# Patient Record
Sex: MALE | Race: WHITE | NOT HISPANIC OR LATINO | Employment: FULL TIME | URBAN - METROPOLITAN AREA
[De-identification: names, ages, dates, MRNs, and addresses within clinical notes are randomized per-mention and may not be internally consistent; named-entity substitution may affect disease eponyms.]

---

## 2019-07-09 NOTE — PROGRESS NOTES
Subjective     John Price is a 49 year old male who presents to clinic today for the following health issues:    HPI     Headache  Onset: x 1 month    Description:   Location: Back of head up to the front   Character: sharp pain  Frequency:  Daily  Duration:  Last most of the day    Intensity: moderate, 2/10    Progression of Symptoms:  same    Accompanying Signs & Symptoms:  Stiff neck: YES  Neck or upper back pain: YES  Fever: no  Sinus pressure: YES  Nausea or vomiting: no  Dizziness: YES  Numbness: no  Weakness: no  Visual changes: no    History:   Head trauma: no  Family history of migraines: no  Previous tests for headaches: no  Neurologist evaluations: no  Able to do daily activities: YES  Wake with a headaches: YES  Do headaches wake you up: YES  Daily pain medication use: YES- Advil Cold & Sinus  Work/school stressors/changes: YES- Work, Daughter getting     Precipitating factors:   Does light make it worse: YES  Does sound make it worse: no    Alleviating factors:  Does sleep help: no  Therapies Tried and outcome: Naproxyn (Aleve)      -------------------------------------    Patient Active Problem List   Diagnosis     Anxiety state     Meniere's disease     Chronic rhinitis     Closed displaced fracture of fifth metacarpal bone of right hand     Degenerative tear of acetabular labrum of right hip     Obstructive sleep apnea syndrome     Morbid obesity (H)     Nonintractable episodic headache, unspecified headache type     History reviewed. No pertinent surgical history.    Social History     Tobacco Use     Smoking status: Never Smoker     Smokeless tobacco: Current User   Substance Use Topics     Alcohol use: Yes     History reviewed. No pertinent family history.      Current Outpatient Medications   Medication Sig Dispense Refill     clonazePAM (KLONOPIN) 1 MG tablet Take 1 mg by mouth daily       montelukast (SINGULAIR) 10 MG tablet Take 10 mg by mouth At Bedtime       No Known  "Allergies  BP Readings from Last 3 Encounters:   07/12/19 126/82    Wt Readings from Last 3 Encounters:   07/12/19 134.7 kg (297 lb)                    Reviewed and updated as needed this visit by Provider  Tobacco  Allergies  Meds  Problems  Med Hx  Surg Hx  Fam Hx         Review of Systems   ROS COMP: Constitutional, HEENT, cardiovascular, pulmonary, gi and gu systems are negative, except as otherwise noted.      Objective    /82 (BP Location: Right arm, Patient Position: Chair, Cuff Size: Adult Large)   Pulse 72   Temp 98  F (36.7  C) (Temporal)   Resp 16   Ht 1.803 m (5' 11\")   Wt 134.7 kg (297 lb)   SpO2 100%   BMI 41.42 kg/m    Body mass index is 41.42 kg/m .  Physical Exam   Constitutional: He appears well-developed and well-nourished.   HENT:   Head: Normocephalic and atraumatic.   Right Ear: External ear normal.   Left Ear: External ear normal.   Nose: Nose normal.   Narrow airway   Eyes: EOM are normal.   Neck: Neck supple.   Cardiovascular: Normal rate, regular rhythm, normal heart sounds and intact distal pulses.   Pulmonary/Chest: Effort normal and breath sounds normal.   Psychiatric: He has a normal mood and affect. His behavior is normal. Judgment and thought content normal.          Diagnostic Test Results:  Labs reviewed in Epic        Assessment & Plan   Problem List Items Addressed This Visit     Obstructive sleep apnea syndrome - Primary     Patient is a pleasant 49-year-old with history of obstructive sleep apnea, morbid obesity, anxiety, Ménière's disease who is here to establish care with new complaints of headache going on for the past 1 month.  He mostly has these headaches in the back of his neck going all the way up his scalp into his forehead.  Based on presentation these seem like tension headaches from lack of sleep and anxiety/stress.  He does have a diagnosis of sleep apnea but has not been using his sleep apnea which could be triggering the headaches.  I " "encouraged consistent use of CPAP before pursuing further investigations to evaluate his headache.  Patient is agreeable to this plan.  Advised to maintain headache log for the next month.  Follow-up in 1 month for reevaluation.         Morbid obesity (H)    Nonintractable episodic headache, unspecified headache type    Relevant Medications    clonazePAM (KLONOPIN) 1 MG tablet      Other Visit Diagnoses     Screen for colon cancer        Screening for hyperlipidemia                 BMI:   Estimated body mass index is 41.42 kg/m  as calculated from the following:    Height as of this encounter: 1.803 m (5' 11\").    Weight as of this encounter: 134.7 kg (297 lb).           See Patient Instructions  Return in about 2 months (around 9/12/2019).    Yessi Gan MD  [facility]    "

## 2019-07-12 ENCOUNTER — OFFICE VISIT (OUTPATIENT)
Dept: FAMILY MEDICINE | Facility: OTHER | Age: 49
End: 2019-07-12
Payer: COMMERCIAL

## 2019-07-12 VITALS
WEIGHT: 297 LBS | SYSTOLIC BLOOD PRESSURE: 126 MMHG | HEART RATE: 72 BPM | DIASTOLIC BLOOD PRESSURE: 82 MMHG | TEMPERATURE: 98 F | RESPIRATION RATE: 16 BRPM | OXYGEN SATURATION: 100 % | BODY MASS INDEX: 41.58 KG/M2 | HEIGHT: 71 IN

## 2019-07-12 DIAGNOSIS — Z12.11 SCREEN FOR COLON CANCER: ICD-10-CM

## 2019-07-12 DIAGNOSIS — G47.33 OBSTRUCTIVE SLEEP APNEA SYNDROME: Primary | ICD-10-CM

## 2019-07-12 DIAGNOSIS — R51.9 NONINTRACTABLE EPISODIC HEADACHE, UNSPECIFIED HEADACHE TYPE: ICD-10-CM

## 2019-07-12 DIAGNOSIS — E66.01 MORBID OBESITY (H): ICD-10-CM

## 2019-07-12 DIAGNOSIS — Z13.220 SCREENING FOR HYPERLIPIDEMIA: ICD-10-CM

## 2019-07-12 PROBLEM — M24.151 DEGENERATIVE TEAR OF ACETABULAR LABRUM OF RIGHT HIP: Status: ACTIVE | Noted: 2018-09-07

## 2019-07-12 PROBLEM — S62.306A CLOSED DISPLACED FRACTURE OF FIFTH METACARPAL BONE OF RIGHT HAND: Status: ACTIVE | Noted: 2018-01-22

## 2019-07-12 PROCEDURE — 99203 OFFICE O/P NEW LOW 30 MIN: CPT | Performed by: FAMILY MEDICINE

## 2019-07-12 RX ORDER — MONTELUKAST SODIUM 10 MG/1
10 TABLET ORAL AT BEDTIME
COMMUNITY
Start: 2019-03-29 | End: 2020-12-11

## 2019-07-12 RX ORDER — CLONAZEPAM 1 MG/1
1 TABLET ORAL DAILY
COMMUNITY
Start: 2017-03-06 | End: 2020-11-05

## 2019-07-12 ASSESSMENT — PAIN SCALES - GENERAL: PAINLEVEL: MILD PAIN (2)

## 2019-07-12 ASSESSMENT — MIFFLIN-ST. JEOR: SCORE: 2224.31

## 2019-07-12 NOTE — ASSESSMENT & PLAN NOTE
Patient is a pleasant 49-year-old with history of obstructive sleep apnea, morbid obesity, anxiety, Ménière's disease who is here to establish care with new complaints of headache going on for the past 1 month.  He mostly has these headaches in the back of his neck going all the way up his scalp into his forehead.  Based on presentation these seem like tension headaches from lack of sleep and anxiety/stress.  He does have a diagnosis of sleep apnea but has not been using his sleep apnea which could be triggering the headaches.  I encouraged consistent use of CPAP before pursuing further investigations to evaluate his headache.  Patient is agreeable to this plan.  Advised to maintain headache log for the next month.  Follow-up in 1 month for reevaluation.

## 2020-06-30 DIAGNOSIS — J30.9 ALLERGIC RHINITIS: Primary | ICD-10-CM

## 2020-06-30 PROCEDURE — 36415 COLL VENOUS BLD VENIPUNCTURE: CPT | Performed by: OTOLARYNGOLOGY

## 2020-06-30 PROCEDURE — 86003 ALLG SPEC IGE CRUDE XTRC EA: CPT | Performed by: OTOLARYNGOLOGY

## 2020-07-02 LAB
A ALTERNATA IGE QN: <0.1 KU(A)/L
A FUMIGATUS IGE QN: <0.1 KU(A)/L
CAT DANDER IGG QN: <0.1 KU(A)/L
COMMON RAGWEED IGE QN: <0.1 KU(A)/L
D FARINAE IGE QN: <0.1 KU(A)/L
D PTERONYSS IGE QN: <0.1 KU(A)/L
DOG DANDER+EPITH IGE QN: <0.1 KU(A)/L
KENT BLUE GRASS IGE QN: <0.1 KU(A)/L
MUGWORT IGE QN: <0.1 KU(A)/L
TIMOTHY IGE QN: <0.1 KU(A)/L
WHITE ELM IGE QN: <0.1 KU(A)/L
WHITE OAK IGE QN: <0.1 KU(A)/L

## 2020-11-05 ENCOUNTER — OFFICE VISIT (OUTPATIENT)
Dept: FAMILY MEDICINE | Facility: CLINIC | Age: 50
End: 2020-11-05
Payer: COMMERCIAL

## 2020-11-05 VITALS
HEART RATE: 96 BPM | SYSTOLIC BLOOD PRESSURE: 128 MMHG | OXYGEN SATURATION: 96 % | BODY MASS INDEX: 43.12 KG/M2 | TEMPERATURE: 97.6 F | WEIGHT: 309.2 LBS | RESPIRATION RATE: 12 BRPM | DIASTOLIC BLOOD PRESSURE: 88 MMHG

## 2020-11-05 DIAGNOSIS — L30.1 DYSHIDROTIC ECZEMA: Primary | ICD-10-CM

## 2020-11-05 PROCEDURE — 99213 OFFICE O/P EST LOW 20 MIN: CPT | Performed by: PHYSICIAN ASSISTANT

## 2020-11-05 RX ORDER — PREDNISONE 20 MG/1
TABLET ORAL
Qty: 11 TABLET | Refills: 0 | Status: SHIPPED | OUTPATIENT
Start: 2020-11-05 | End: 2020-11-24

## 2020-11-05 RX ORDER — MELOXICAM 7.5 MG/1
TABLET ORAL
COMMUNITY
Start: 2020-07-11 | End: 2020-12-11

## 2020-11-05 RX ORDER — SILDENAFIL 100 MG/1
TABLET, FILM COATED ORAL
COMMUNITY
Start: 2020-08-03 | End: 2020-12-11

## 2020-11-05 RX ORDER — VENLAFAXINE HYDROCHLORIDE 150 MG/1
CAPSULE, EXTENDED RELEASE ORAL
COMMUNITY
Start: 2020-10-17 | End: 2021-12-14

## 2020-11-05 RX ORDER — BENZTROPINE MESYLATE 0.5 MG/1
TABLET ORAL
COMMUNITY
Start: 2020-10-17 | End: 2021-12-19

## 2020-11-05 RX ORDER — TRIAMCINOLONE ACETONIDE 5 MG/G
CREAM TOPICAL 2 TIMES DAILY
Qty: 30 G | Refills: 0 | Status: SHIPPED | OUTPATIENT
Start: 2020-11-05 | End: 2020-11-19

## 2020-11-05 ASSESSMENT — ENCOUNTER SYMPTOMS
HEADACHES: 0
WHEEZING: 0
SHORTNESS OF BREATH: 0
CHILLS: 0
ARTHRALGIAS: 0
ADENOPATHY: 0
COUGH: 0
FATIGUE: 0
EYE DISCHARGE: 0
FEVER: 0
JOINT SWELLING: 0
DIAPHORESIS: 0
MYALGIAS: 0
EYE REDNESS: 0
EYE ITCHING: 0

## 2020-11-05 NOTE — PROGRESS NOTES
Subjective     John Price is a 50 year old male who presents to clinic today for the following health issues:    HPI         Rash  Onset/Duration: since sunday  Description  Location: both hands  Character: red  Itching: severe  Intensity:  severe  Progression of Symptoms:  same  Accompanying signs and symptoms:   Fever: no  Body aches or joint pain: no  Sore throat symptoms: no  Recent cold symptoms: no  History:           Previous episodes of similar rash: None  New exposures:  None  Recent travel: no  Exposure to similar rash: no  Precipitating or alleviating factors: none  Therapies tried and outcome: calamine lotion does not help and Benadryl - cream -  usually effective    John presents to the clinic today for evaluation of an itchy rash on his bilateral hands. He notes symptoms started Sunday, 5 days ago. His hands are intensely itchy, red and a little swollen. He notes some small bumps on the palms of his hands and his fingers, particularly between his fingers but the dorsal and palmar surfaces are both affected as well. He does not recall any trigger. It does not affect his feet or any other body parts. No new meds, pets, foods, soaps, detergents, lotions, or enviornmental contacts.    Review of Systems   Constitutional: Negative for chills, diaphoresis, fatigue and fever.   Eyes: Negative for discharge, redness and itching.   Respiratory: Negative for cough, shortness of breath and wheezing.    Musculoskeletal: Negative for arthralgias, joint swelling and myalgias.   Skin: Positive for rash (bilateral hands). Negative for pallor.   Neurological: Negative for headaches.   Hematological: Negative for adenopathy.          Objective    /88 (BP Location: Right arm)   Pulse 96   Temp 97.6  F (36.4  C) (Temporal)   Resp 12   Wt 140.3 kg (309 lb 3.2 oz)   SpO2 96%   BMI 43.12 kg/m    Body mass index is 43.12 kg/m .  Physical Exam   GENERAL: healthy, alert and no distress  MS: no gross  musculoskeletal defects noted, no edema  SKIN: Bilateral hands with swollen fingers that are minimally diffusely erythematous. Palms and webbing between fingers with small firm flesh colored papules. Dorsal fingers erythematous from excoriation. Dorsum of hand mostly spared.  PSYCH: mentation appears normal, affect normal/bright    Assessment & Plan     Dyshidrotic eczema  - predniSONE (DELTASONE) 20 MG tablet; Take 2 tabs daily x 3 days, then 1 tab daily x 3 days, then 1/2 tab daily x 3 days.  - triamcinolone (ARISTOCORT HP) 0.5 % external cream; Apply topically 2 times daily for 14 days    Discussed etiology. Avoid soaking in water. Wife is severely allergic to topical steroids. It causes a local reaction and throat swelling in her. We opted for oral steroids with topicals for him to use this weekend while hunting and away from his wife.    Return in about 2 weeks (around 11/19/2020) for Recheck with primary care provider if not improving.    Winnie Chaidez PA-C  [facility]

## 2020-11-05 NOTE — PATIENT INSTRUCTIONS
Prednisone taper as directed.  Triamcinolone cream to affected areas up to twice daily for up to 14 days.  Benadryl (diphenhydramine) 25-50 mg to help with sleep  Apply steroid cream only to affected areas twice daily. Avoid face and groin.  Eucerin, Aquaphor, VaniCream cream to whole body daily, especially right after bathing.  Avoid lotions with a scent as these can be irritating.

## 2020-11-11 ENCOUNTER — VIRTUAL VISIT (OUTPATIENT)
Dept: FAMILY MEDICINE | Facility: OTHER | Age: 50
End: 2020-11-11

## 2020-11-11 NOTE — PROGRESS NOTES
"Date: 2020 15:06:41  Clinician: Ban Acosta  Clinician NPI: [id number]  Patient: John Price  Patient : 1970  Patient Address: [id number] [place] [address] [place] [address]  Patient Phone: [phone]  Visit Protocol: URI  Patient Summary:  John is a 50 year old ( : 1970 ) male who initiated a [facility] Visit for COVID-19 (Coronavirus) evaluation and screening. When asked the question \"Please sign me up to receive news, health information and promotions. \", John responded \"No\".    John states his symptoms started suddenly 3-4 days ago.   His symptoms consist of myalgia, malaise, a sore throat, a headache, wheezing, a cough, and nasal congestion. He is experiencing mild difficulty breathing with activities but can speak normally in full sentences.   Symptom details     Nasal secretions: The color of his mucus is clear.    Cough: John coughs every 5-10 minutes and his cough is not more bothersome at night. Phlegm comes into his throat when he coughs. He does not believe his cough is caused by post-nasal drip. The color of the phlegm is clear.     Sore throat: John reports having severe throat pain (7-9 on a 10 point pain scale), does not have exudate on his tonsils, and can swallow liquids. The lymph nodes in his neck are not enlarged. A rash has not appeared on the skin since the sore throat started.     Wheezing: John has not ever been diagnosed with asthma. Additional wheezing details as reported by the patient (free text): Feeling run down hard to breathe       Headache: He states the headache is moderate (4-6 on a 10 point pain scale).      John denies having vomiting, rhinitis, facial pain or pressure, chills, teeth pain, ageusia, diarrhea, ear pain, fever, enlarged lymph nodes, nausea, and anosmia. He also denies taking antibiotic medication in the past month, having recent facial or sinus surgery in the past 60 days, and double sickening (worsening symptoms after initial " improvement).   Precipitating events  John is not sure if he has been exposed to someone with strep throat. He has not recently been exposed to someone with influenza. John has not been in close contact with any high risk individuals.   Pertinent COVID-19 (Coronavirus) information  John does not work or volunteer as healthcare worker or a . In the past 14 days, John has not worked or volunteered at a healthcare facility or group living setting.   In the past 14 days, he also has not lived in a congregate living setting.   John has not had a close contact with a laboratory-confirmed COVID-19 patient within 14 days of symptom onset.    Since December 2019, John has not been tested for COVID-19 and has not had upper respiratory infection or influenza-like illness.   Pertinent medical history  John needs a return to work/school note.   Weight: 300 lbs   John does not smoke or use smokeless tobacco.   Weight: 300 lbs    MEDICATIONS: No current medications, ALLERGIES: NKDA  Clinician Response:  Dear John,   Your symptoms show that you may have coronavirus (COVID-19). This illness can cause fever, cough and trouble breathing. Many people get a mild case and get better on their own. Some people can get very sick.  Because you also reported sore throat I would like to also test you for Strep Throat to determine if we need to treat you for that as well.  What should I do?  We would like to test you for the COVID-19 virus and the streptococcus bacteria.   1. Please call [phone] to schedule your visit. Explain that you were referred by [facility] to have a COVID-19 test and a Strep test. [facility].  * If you need to schedule in [facility] please call [phone] or for [facility] employees please call [phone]  The following will serve as your written order for the COVID Test, ordered by me, for the indication of suspected COVID [Z20.828]: The test will be  "ordered in [employer], our electronic health record, after you are scheduled. It will show as ordered and authorized by Tan Villanueva MD.  Order: COVID-19 (Coronavirus) PCR for SYMPTOMATIC testing from [facility].  The following will serve as your written order for this Strep Test, ordered by me, for the indication of suspected Strep throat [R07.0]: The test will be ordered in [employer], our electronic health record, after you are scheduled. It will show as ordered and authorized by Tan Villanueva MD.  Order: Streptococcus A Rapid Screen with reflex to PCR testing from [facility].  2. When it's time for your COVID and Strep test:  Stay at least 6 feet away from others. (If someone will drive you to your test, stay in the backseat, as far away from the  as you can.)  Cover your mouth and nose with a mask, tissue or washcloth.  Go straight to the testing site. Don't make any stops on the way there or back.  3.Starting now: Stay home and away from others (self-isolate) until:  You've had no fever---and no medicine that reduces fever---for one full day (24 hours). And...  Your other symptoms have gotten better. For example, your cough or breathing has improved. And...  At least 10 days have passed since your symptoms started.  During this time, don't leave the house except for testing or medical care.  Stay in your own room, even for meals. Use your own bathroom if you can.  Stay away from others in your home. No hugging, kissing or shaking hands. No visitors.  Don't go to work, school or anywhere else.  Clean \"high touch\" surfaces often (doorknobs, counters, handles, etc.). Use a household cleaning spray or wipes. You'll find a full list of  on the EPA website: www.epa.gov/pesticide-registration/list-n-disinfectants-use-against-sars-cov-2.  Cover your mouth and nose with a mask, tissue or washcloth to avoid spreading germs.  Wash your hands and face often. Use soap and water.  Caregivers in these groups are at risk for severe " illness due to COVID-19:  o People 65 years and older  o People who live in a nursing home or long-term care facility  o People with chronic disease (lung, heart, cancer, diabetes, kidney, liver, immunologic)  o People who have a weakened immune system, including those who:  Are in cancer treatment  Take medicine that weakens the immune system, such as corticosteroids  Had a bone marrow or organ transplant  Have an immune deficiency  Have poorly controlled HIV or AIDS  Are obese (body mass index of 40 or higher)  Smoke regularly  o Caregivers should wear gloves while washing dishes, handling laundry and cleaning bedrooms and bathrooms.  o Use caution when washing and drying laundry: Don't shake dirty laundry, and use the warmest water setting that you can.  o For more tips, go to www.cdc.gov/coronavirus/2019-ncov/downloads/10Things.pdf.  4.Sign up for [employer] We know it's scary to hear that you might have COVID-19. We want to track your symptoms to make sure you're okay over the next 2 weeks. Please look for an email from [employer]---this is a free, online program that we'll use to keep in touch. To sign up, follow the link in the email. Learn more at [email or web address]  How can I take care of myself?  Get lots of rest. Drink extra fluids (unless a doctor has told you not to).  Take Tylenol (acetaminophen) for fever or pain. If you have liver or kidney problems, ask your family doctor if it's okay to take Tylenol.  Adults can take either:  650 mg (two 325 mg pills) every 4 to 6 hours, or...  1,000 mg (two 500 mg pills) every 8 hours as needed.  Note: Don't take more than 3,000 mg in one day. Acetaminophen is found in many medicines (both prescribed and over-the-counter medicines). Read all labels to be sure you don't take too much.  For children, check the Tylenol bottle for the right dose. The dose is based on the child's age or weight.  If you have other health problems (like cancer, heart  failure, an organ transplant or severe kidney disease): Call your specialty clinic if you don't feel better in the next 2 days.  Know when to call 911. Emergency warning signs include:  Trouble breathing or shortness of breath  Pain or pressure in the chest that doesn't go away  Feeling confused like you haven't felt before, or not being able to wake up  Bluish-colored lips or face.  Where can I get more information?  [facility] -- About COVID-19: [email or web address]  CDC -- What to Do If You're Sick: www.cdc.gov/coronavirus/2019-ncov/about/steps-when-sick.html  CDC -- Ending Home Isolation: www.cdc.gov/coronavirus/2019-ncov/hcp/disposition-in-home-patients.html  CDC -- Caring for Someone: www.cdc.gov/coronavirus/2019-ncov/if-you-are-sick/care-for-someone.html  [facility] -- Interim Guidance for Hospital Discharge to Home: www.[facility].[place]./diseases/coronavirus/hcp/hospdischarge.pdf  [facility] clinical trials (COVID-19 research studies): clinicalaffairs.[facility]-clinical-trials  Below are the COVID-19 hotlines at the [place] Department of Health ([facility]). Interpreters are available.  For health questions: Call 308-004-5170 or 1-939.970.1008 (7 a.m. to 7 p.m.)  For questions about schools and childcare: Call 055-654-1741 or 1-933.928.4538 (7 a.m. to 7 p.m.)          Diagnosis: Contact with and (suspected) exposure to other communicable diseases  Diagnosis ICD: Z20.89

## 2020-11-14 ENCOUNTER — TELEPHONE (OUTPATIENT)
Dept: FAMILY MEDICINE | Facility: CLINIC | Age: 50
End: 2020-11-14

## 2020-11-14 NOTE — TELEPHONE ENCOUNTER
Reason for call:  Medication   If this is a refill request, has the caller requested the refill from the pharmacy already? No  Will the patient be using a [facility]? No  Name of the pharmacy and phone number for the current request: [facility]     Name of the medication requested: singular    Other request: n/a  Phone number to reach patient:  Home number on file [phone] (home)    Best Time:  any    Can we leave a detailed message on this number?  YES    Travel screening: Not Applicable

## 2020-11-23 ENCOUNTER — VIRTUAL VISIT (OUTPATIENT)
Dept: FAMILY MEDICINE | Facility: OTHER | Age: 50
End: 2020-11-23

## 2020-11-23 NOTE — PROGRESS NOTES
"Date: 2020 13:02:45  Clinician: Roshan Chao  Clinician NPI: [id number]  Patient: John Price  Patient : 1970  Patient Address: [address] [facility] [place] [address]  Patient Phone: [phone]  Visit Protocol: URI  Patient Summary:  John is a 50 year old ( : 1970 ) male who initiated a [facility] Visit for cold, sinus infection, or influenza. When asked the question \"Please sign me up to receive news, health information and promotions. \", John responded \"No\".    John states his symptoms started suddenly 2-3 weeks ago. After his symptoms started, they improved and then got worse again.   His symptoms consist of rhinitis, malaise, tooth pain, a headache, wheezing, a cough, and nasal congestion. He is experiencing mild difficulty breathing with activities but can speak normally in full sentences.   Symptom details     Nasal secretions: The color of his mucus is yellow and clear.    Cough: John coughs a few times an hour and his cough is not more bothersome at night. Phlegm comes into his throat when he coughs. He does not believe his cough is caused by post-nasal drip. The color of the phlegm is yellow.     Wheezing: John has not ever been diagnosed with asthma. Additional wheezing details as reported by the patient (free text): Not much of a wheeze, just occasionally       Headache: He states the headache is mild (1-3 on a 10 point pain scale).     Tooth pain: The tooth pain is not caused by a cavity, recent dental work, or other mouth problems.      John denies having vomiting, facial pain or pressure, myalgias, chills, sore throat, ageusia, diarrhea, ear pain, fever, nausea, and anosmia. He also denies taking antibiotic medication in the past month and having recent facial or sinus surgery in the past 60 days.   Precipitating events  He has not recently been exposed to someone with influenza. John has been in close contact with the following high risk individuals: people with asthma, " heart disease or diabetes.   Pertinent COVID-19 (Coronavirus) information  John does not work or volunteer as healthcare worker or a . In the past 14 days, John has not worked or volunteered at a healthcare facility or group living setting.   In the past 14 days, he also has not lived in a congregate living setting.   John has had a close contact with a laboratory-confirmed COVID-19 patient within 14 days of symptom onset. He was not exposed at his work. Date John was exposed to the laboratory-confirmed COVID-19 patient: 11/09/2020   Additional information about contact with COVID-19 (Coronavirus) patient as reported by the patient (free text): I have tested negative after contagion period of wife, twice. I fear I may have pneumonia. As I am not really getting better.    Since December 2019, John has been tested for COVID-19 and has had upper respiratory infection (URI) or influenza-like illness.      Result of COVID-19 test: Negative     Date of his COVID-19 test: 11/18/2020     Date(s) of previous URI or influenza-like illness (free-text): Between [place] and New Years     Symptoms John experienced during previous URI or influenza-like illness as reported by the patient (free-text): Sore throat cough and nasel congestion        Pertinent medical history  John had 2 sinus infections within the past year.   John does not need a return to work/school note.   Weight: 300 lbs   John does not smoke or use smokeless tobacco.   Additional information as reported by the patient (free text): Would like to have a chest X-ray completed   Weight: 300 lbs    MEDICATIONS: No current medications, ALLERGIES: NKDA  Clinician Response:  Dear John,  I am sorry you are not feeling well. To determine the most appropriate care for you, I would like you to be seen in person to further discuss your health history and symptoms.  You will not be charged for this [facility] Visit. Thank you for trusting us with your  care.  COVID-19 (Coronavirus) General Information  Because there is currently no vaccine to prevent infection, the best way to protect yourself is to avoid being exposed to this virus. Common symptoms of COVID-19 include but are not limited to fever, cough, and shortness of breath. These symptoms appear 2-14 days after you are exposed to the virus that causes COVID-19. Click here for more information from the CDC on how to protect yourself.  If you are sick with COVID-19 or suspect you are infected with the virus that causes COVID-19, follow the steps here from the CDC to help prevent the disease from spreading to people in your home and community.  Click here for general information from the CDC on testing.  If you develop any of these emergency warning signs for COVID-19, get medical attention immediately:     Trouble breathing    Persistent pain or pressure in the chest    New confusion or inability to arouse    Bluish lips or face      Call your doctor or clinic before going in. Call 911 if you have a medical emergency and notify the  you have or think you may have COVID-19.  For more detailed and up to date information on COVID-19 (Coronavirus), please visit the CDC website.   Diagnosis: Refer for additional evaluation  Diagnosis ICD: R69

## 2020-11-24 ENCOUNTER — OFFICE VISIT (OUTPATIENT)
Dept: FAMILY MEDICINE | Facility: CLINIC | Age: 50
End: 2020-11-24
Payer: COMMERCIAL

## 2020-11-24 VITALS
DIASTOLIC BLOOD PRESSURE: 94 MMHG | RESPIRATION RATE: 18 BRPM | TEMPERATURE: 98.2 F | OXYGEN SATURATION: 97 % | SYSTOLIC BLOOD PRESSURE: 144 MMHG | HEART RATE: 93 BPM

## 2020-11-24 DIAGNOSIS — Z20.822 SUSPECTED COVID-19 VIRUS INFECTION: Primary | ICD-10-CM

## 2020-11-24 PROCEDURE — 99213 OFFICE O/P EST LOW 20 MIN: CPT | Performed by: FAMILY MEDICINE

## 2020-11-24 PROCEDURE — U0003 INFECTIOUS AGENT DETECTION BY NUCLEIC ACID (DNA OR RNA); SEVERE ACUTE RESPIRATORY SYNDROME CORONAVIRUS 2 (SARS-COV-2) (CORONAVIRUS DISEASE [COVID-19]), AMPLIFIED PROBE TECHNIQUE, MAKING USE OF HIGH THROUGHPUT TECHNOLOGIES AS DESCRIBED BY CMS-2020-01-R: HCPCS | Performed by: FAMILY MEDICINE

## 2020-11-24 NOTE — PROGRESS NOTES
Subjective     John Price is a 50 year old male who presents to clinic today for the following health issues:    HPI         Acute Illness  Acute illness concerns: cough and sob, tested twice for covid and neg results, tested on 11th and 18th:  Rapid salvia testing.  No nasopharyngeal PCR tests.   Onset/Duration: x 2 weeks  Symptoms:  Fever: no  Chills/Sweats: no  Headache (location?): YES, dull headache worse when coughing, sore ribs from coughing  Sinus Pressure: yes  Conjunctivitis:  YES  Ear Pain: no  Rhinorrhea: YES  Congestion: YES  Sore Throat: yes, slight  Cough: YES-non-productive, productive of yellow sputum  Wheeze: no  Decreased Appetite: no  Nausea: no  Vomiting: no  Diarrhea: no  Dysuria/Freq.: no  Dysuria or Hematuria: no  Fatigue/Achiness: YES, fatigue  Sick/Strep Exposure: no  Therapies tried and outcome: dayquil and nyquil, helps sometimes    Symptoms started 15 days ago and he is somewhat better.  Still having some shortness of breath with exertion and cough.    Review of Systems   Constitutional, HEENT, cardiovascular, pulmonary, GI, , musculoskeletal, neuro, skin, endocrine and psych systems are negative, except as otherwise noted.      Objective    BP (!) 144/94   Pulse 93   Temp 98.2  F (36.8  C) (Temporal)   Resp 18   SpO2 97%   There is no height or weight on file to calculate BMI.  Physical Exam  Constitutional:       Appearance: He is well-developed. He is obese.   Cardiovascular:      Rate and Rhythm: Normal rate and regular rhythm.      Heart sounds: Normal heart sounds, S1 normal and S2 normal. No murmur.   Pulmonary:      Effort: Pulmonary effort is normal. No respiratory distress.      Breath sounds: Normal breath sounds. No wheezing, rhonchi or rales.   Neurological:      Mental Status: He is alert.                    Assessment & Plan     ASSESSMENT/ORDERS:    ICD-10-CM    1. Suspected COVID-19 virus infection  Z20.828 Symptomatic COVID-19 Virus (Coronavirus) by PCR      PLAN:  1.  Patient has clinical symptoms for COVID-19 infection and is improving overall, though still having some symptoms.  His lung exam is good and he is afebrile, so did not recommend chest x-ray as it will not change today's treatment recommendation.  2.  Did recommend PCR COVID-19 test as his other 2 tests were rapid antigen tests, which are not as sensitive.  This is so that if he tests positive in the next 90 days, we can compare this test, especially if today's test is positive, this will help confirm his diagnosis of COVID-19 right now.  It will also clarify any further upper respiratory illness that he may have in the near future.            Return in about 4 weeks (around 12/22/2020) for recheck if symptoms worsen or fail to improve.    Timothy Lozano MD  [facility]

## 2020-11-25 LAB
SARS-COV-2 RNA SPEC QL NAA+PROBE: NOT DETECTED
SPECIMEN SOURCE: NORMAL

## 2020-12-01 ENCOUNTER — MYC MEDICAL ADVICE (OUTPATIENT)
Dept: FAMILY MEDICINE | Facility: CLINIC | Age: 50
End: 2020-12-01

## 2020-12-11 ENCOUNTER — OFFICE VISIT (OUTPATIENT)
Dept: FAMILY MEDICINE | Facility: CLINIC | Age: 50
End: 2020-12-11
Payer: COMMERCIAL

## 2020-12-11 VITALS
TEMPERATURE: 97 F | HEART RATE: 98 BPM | BODY MASS INDEX: 44.16 KG/M2 | WEIGHT: 315 LBS | OXYGEN SATURATION: 93 % | DIASTOLIC BLOOD PRESSURE: 82 MMHG | SYSTOLIC BLOOD PRESSURE: 130 MMHG | RESPIRATION RATE: 18 BRPM

## 2020-12-11 DIAGNOSIS — Z23 NEED FOR VACCINATION: ICD-10-CM

## 2020-12-11 DIAGNOSIS — J31.0 CHRONIC RHINITIS: Primary | ICD-10-CM

## 2020-12-11 DIAGNOSIS — E66.01 MORBID OBESITY (H): ICD-10-CM

## 2020-12-11 DIAGNOSIS — G47.33 OBSTRUCTIVE SLEEP APNEA SYNDROME: ICD-10-CM

## 2020-12-11 DIAGNOSIS — F41.1 ANXIETY STATE: ICD-10-CM

## 2020-12-11 PROCEDURE — 90750 HZV VACC RECOMBINANT IM: CPT | Performed by: FAMILY MEDICINE

## 2020-12-11 PROCEDURE — 90472 IMMUNIZATION ADMIN EACH ADD: CPT | Performed by: FAMILY MEDICINE

## 2020-12-11 PROCEDURE — 90471 IMMUNIZATION ADMIN: CPT | Performed by: FAMILY MEDICINE

## 2020-12-11 PROCEDURE — 99214 OFFICE O/P EST MOD 30 MIN: CPT | Mod: 25 | Performed by: FAMILY MEDICINE

## 2020-12-11 PROCEDURE — 90715 TDAP VACCINE 7 YRS/> IM: CPT | Performed by: FAMILY MEDICINE

## 2020-12-11 RX ORDER — NAPROXEN SODIUM 220 MG
220 TABLET ORAL 2 TIMES DAILY WITH MEALS
COMMUNITY
End: 2021-12-19

## 2020-12-11 RX ORDER — MONTELUKAST SODIUM 10 MG/1
10 TABLET ORAL AT BEDTIME
Qty: 90 TABLET | Refills: 4 | Status: SHIPPED | OUTPATIENT
Start: 2020-12-11 | End: 2021-06-25

## 2020-12-11 RX ORDER — MULTIVIT WITH MINERALS/LUTEIN
1 TABLET ORAL DAILY
COMMUNITY
End: 2020-12-11

## 2020-12-11 ASSESSMENT — PAIN SCALES - GENERAL: PAINLEVEL: NO PAIN (0)

## 2020-12-11 NOTE — NURSING NOTE
Discharge summary sent to Dr. Wilfredo Ansari.   Prior to immunization administration, verified patients identity using patient s name and date of birth. Please see Immunization Activity for additional information.     Screening Questionnaire for Adult Immunization    Are you sick today?   No   Do you have allergies to medications, food, a vaccine component or latex?   No   Have you ever had a serious reaction after receiving a vaccination?   No   Do you have a long-term health problem with heart, lung, kidney, or metabolic disease (e.g., diabetes), asthma, a blood disorder, no spleen, complement component deficiency, a cochlear implant, or a spinal fluid leak?  Are you on long-term aspirin therapy?   No   Do you have cancer, leukemia, HIV/AIDS, or any other immune system problem?   No   Do you have a parent, brother, or sister with an immune system problem?   No   In the past 3 months, have you taken medications that affect  your immune system, such as prednisone, other steroids, or anticancer drugs; drugs for the treatment of rheumatoid arthritis, Crohn s disease, or psoriasis; or have you had radiation treatments?   No   Have you had a seizure, or a brain or other nervous system problem?   No   During the past year, have you received a transfusion of blood or blood    products, or been given immune (gamma) globulin or antiviral drug?   No   For women: Are you pregnant or is there a chance you could become       pregnant during the next month?   No   Have you received any vaccinations in the past 4 weeks?   No     Immunization questionnaire answers were all negative.        Per orders of Dr. Lozano, injection of shingrix and Tdap given by Valerie Bowers CMA. Patient instructed to remain in clinic for 15 minutes afterwards, and to report any adverse reaction to me immediately.       Screening performed by Valerie Bowers CMA on 12/11/2020 at 2:51 PM.

## 2020-12-11 NOTE — PROGRESS NOTES
Subjective     John Price is a 50 year old male who presents to clinic today for the following health issues:    HPI         New Patient/Transfer of Care    Establishing care.  History of allergies that are well managed with Singulair.      History of anxiety for which he is managed by psychiatry.  Medications reviewed today.    Does acknowledge obesity.  Is interested in weight loss.    History of obstructive sleep apnea.      Needs vaccinations reviewed.     Review of Systems   Constitutional, HEENT, cardiovascular, pulmonary, GI, , musculoskeletal, neuro, skin, endocrine and psych systems are negative, except as otherwise noted.      Objective    /82 (BP Location: Right arm, Patient Position: Sitting, Cuff Size: Adult Large)   Pulse 98   Temp 97  F (36.1  C) (Temporal)   Resp 18   Wt 143.6 kg (316 lb 9.6 oz)   SpO2 93%   BMI 44.16 kg/m    Body mass index is 44.16 kg/m .  Physical Exam  Constitutional:       Appearance: He is well-developed. He is morbidly obese.   Cardiovascular:      Rate and Rhythm: Normal rate and regular rhythm.      Heart sounds: Normal heart sounds, S1 normal and S2 normal. No murmur.   Pulmonary:      Effort: Pulmonary effort is normal. No respiratory distress.      Breath sounds: Normal breath sounds. No wheezing, rhonchi or rales.   Neurological:      Mental Status: He is alert.                    Assessment & Plan     ASSESSMENT/ORDERS:    ICD-10-CM    1. Chronic rhinitis  J31.0 montelukast (SINGULAIR) 10 MG tablet   2. Anxiety state  F41.1    3. Obstructive sleep apnea syndrome  G47.33    4. Morbid obesity (H)  E66.01    5. Need for vaccination  Z23 TDAP VACCINE (Adacel, Boostrix)  [[id number]]     SHINGRIX [[id number]]     CANCELED: ZOSTAVAX [[id number]]     PLAN:  1.  Refilled Singulair for patient's rhinitis.  2.  Discussed obesity management.  Exercise recommended.  3.  Obstructive sleep apnea reviewed.  Sleep clinic referral recommended.  He will let me know if he decides  on this.  4.  Anxiety management continued through psychiatry.            Return in about 1 year (around 12/11/2021) for next preventative visit (Physical).     I spent >25 minutes of face to face time with the patient, >50% of which was spent counseling and coordination of care regarding review of current medical issues and establishment of care.       Timothy Lozano MD  [facility]

## 2021-01-03 ENCOUNTER — HEALTH MAINTENANCE LETTER (OUTPATIENT)
Age: 51
End: 2021-01-03

## 2021-04-23 ENCOUNTER — ALLIED HEALTH/NURSE VISIT (OUTPATIENT)
Dept: FAMILY MEDICINE | Facility: CLINIC | Age: 51
End: 2021-04-23
Payer: COMMERCIAL

## 2021-04-23 DIAGNOSIS — Z23 NEED FOR VACCINATION: Primary | ICD-10-CM

## 2021-04-23 PROCEDURE — 90471 IMMUNIZATION ADMIN: CPT

## 2021-04-23 PROCEDURE — 90750 HZV VACC RECOMBINANT IM: CPT

## 2021-04-23 PROCEDURE — 99207 PR NO CHARGE NURSE ONLY: CPT

## 2021-04-23 NOTE — PROGRESS NOTES
Prior to immunization administration, verified patients identity using patient s name and date of birth. Please see Immunization Activity for additional information.     Screening Questionnaire for Adult Immunization    Are you sick today?   No   Do you have allergies to medications, food, a vaccine component or latex?   No   Have you ever had a serious reaction after receiving a vaccination?   No   Do you have a long-term health problem with heart, lung, kidney, or metabolic disease (e.g., diabetes), asthma, a blood disorder, no spleen, complement component deficiency, a cochlear implant, or a spinal fluid leak?  Are you on long-term aspirin therapy?   No   Do you have cancer, leukemia, HIV/AIDS, or any other immune system problem?   No   Do you have a parent, brother, or sister with an immune system problem?   No   In the past 3 months, have you taken medications that affect  your immune system, such as prednisone, other steroids, or anticancer drugs; drugs for the treatment of rheumatoid arthritis, Crohn s disease, or psoriasis; or have you had radiation treatments?   No   Have you had a seizure, or a brain or other nervous system problem?   No   During the past year, have you received a transfusion of blood or blood    products, or been given immune (gamma) globulin or antiviral drug?   No   For women: Are you pregnant or is there a chance you could become       pregnant during the next month?   No   Have you received any vaccinations in the past 4 weeks?   No     Immunization questionnaire answers were all negative.         Patient instructed to remain in clinic for 15 minutes afterwards, and to report any adverse reaction to me immediately.       Screening performed by Valerie Bowers CMA on 4/23/2021 at 11:31 AM.

## 2021-06-25 ENCOUNTER — OFFICE VISIT (OUTPATIENT)
Dept: FAMILY MEDICINE | Facility: CLINIC | Age: 51
End: 2021-06-25
Payer: COMMERCIAL

## 2021-06-25 VITALS
SYSTOLIC BLOOD PRESSURE: 126 MMHG | HEIGHT: 71 IN | BODY MASS INDEX: 42.73 KG/M2 | HEART RATE: 80 BPM | OXYGEN SATURATION: 94 % | WEIGHT: 305.25 LBS | TEMPERATURE: 98.1 F | DIASTOLIC BLOOD PRESSURE: 88 MMHG | RESPIRATION RATE: 14 BRPM

## 2021-06-25 DIAGNOSIS — Z00.00 ROUTINE GENERAL MEDICAL EXAMINATION AT A HEALTH CARE FACILITY: Primary | ICD-10-CM

## 2021-06-25 DIAGNOSIS — G47.33 OBSTRUCTIVE SLEEP APNEA SYNDROME: ICD-10-CM

## 2021-06-25 DIAGNOSIS — J31.0 CHRONIC RHINITIS: ICD-10-CM

## 2021-06-25 DIAGNOSIS — E66.01 MORBID OBESITY (H): ICD-10-CM

## 2021-06-25 PROBLEM — R51.9 NONINTRACTABLE EPISODIC HEADACHE, UNSPECIFIED HEADACHE TYPE: Status: RESOLVED | Noted: 2019-07-12 | Resolved: 2021-06-25

## 2021-06-25 PROBLEM — S62.306A CLOSED DISPLACED FRACTURE OF FIFTH METACARPAL BONE OF RIGHT HAND: Status: RESOLVED | Noted: 2018-01-22 | Resolved: 2021-06-25

## 2021-06-25 LAB
CHOLEST SERPL-MCNC: 240 MG/DL
GLUCOSE SERPL-MCNC: 127 MG/DL (ref 70–99)
HDLC SERPL-MCNC: 36 MG/DL
LDLC SERPL CALC-MCNC: 178 MG/DL
NONHDLC SERPL-MCNC: 204 MG/DL
TRIGL SERPL-MCNC: 129 MG/DL

## 2021-06-25 PROCEDURE — 82947 ASSAY GLUCOSE BLOOD QUANT: CPT | Performed by: FAMILY MEDICINE

## 2021-06-25 PROCEDURE — 36415 COLL VENOUS BLD VENIPUNCTURE: CPT | Performed by: FAMILY MEDICINE

## 2021-06-25 PROCEDURE — 80061 LIPID PANEL: CPT | Performed by: FAMILY MEDICINE

## 2021-06-25 PROCEDURE — 99396 PREV VISIT EST AGE 40-64: CPT | Performed by: FAMILY MEDICINE

## 2021-06-25 RX ORDER — MONTELUKAST SODIUM 10 MG/1
10 TABLET ORAL AT BEDTIME
Qty: 90 TABLET | Refills: 4 | Status: SHIPPED | OUTPATIENT
Start: 2021-06-25 | End: 2022-01-05

## 2021-06-25 ASSESSMENT — ENCOUNTER SYMPTOMS
NAUSEA: 0
WEAKNESS: 0
ABDOMINAL PAIN: 0
PARESTHESIAS: 0
COUGH: 0
PALPITATIONS: 0
FEVER: 0
SORE THROAT: 0
HEADACHES: 0
DYSURIA: 0
SHORTNESS OF BREATH: 0
FREQUENCY: 0
HEARTBURN: 0
JOINT SWELLING: 0
DIARRHEA: 0
CHILLS: 0
EYE PAIN: 0
MYALGIAS: 0
CONSTIPATION: 0
HEMATURIA: 0
DIZZINESS: 0
HEMATOCHEZIA: 0
ARTHRALGIAS: 0
NERVOUS/ANXIOUS: 0

## 2021-06-25 ASSESSMENT — PAIN SCALES - GENERAL: PAINLEVEL: NO PAIN (0)

## 2021-06-25 ASSESSMENT — MIFFLIN-ST. JEOR: SCORE: 2256.73

## 2021-06-25 NOTE — PROGRESS NOTES
SUBJECTIVE:   CC: John Price is an 50 year old male who presents for preventative health visit.       Patient has been advised of split billing requirements and indicates understanding: Yes  Healthy Habits:     Getting at least 3 servings of Calcium per day:  NO    Bi-annual eye exam:  Yes    Dental care twice a year:  Yes    Sleep apnea or symptoms of sleep apnea:  Daytime drowsiness and Excessive snoring    Diet:  Regular (no restrictions)    Frequency of exercise:  None    Taking medications regularly:  Yes    Barriers to taking medications:  None    Medication side effects:  None    PHQ-2 Total Score: 0    Additional concerns today:  No              Today's PHQ-2 Score:   PHQ-2 ( 1999 Pfizer) 6/25/2021   Q1: Little interest or pleasure in doing things 0   Q2: Feeling down, depressed or hopeless 0   PHQ-2 Score 0   Q1: Little interest or pleasure in doing things Not at all   Q2: Feeling down, depressed or hopeless Not at all   PHQ-2 Score 0       Abuse: Current or Past(Physical, Sexual or Emotional)- No  Do you feel safe in your environment? Yes    Have you ever done Advance Care Planning? (For example, a Health Directive, POLST, or a discussion with a medical provider or your loved ones about your wishes): No, advance care planning information given to patient to review.  Patient declined advance care planning discussion at this time.    Social History     Tobacco Use     Smoking status: Never Smoker     Smokeless tobacco: Former User   Substance Use Topics     Alcohol use: Yes         Alcohol Use 6/25/2021   Prescreen: >3 drinks/day or >7 drinks/week? No       Last PSA: No results found for: PSA    Reviewed orders with patient. Reviewed health maintenance and updated orders accordingly - Yes      Reviewed and updated as needed this visit by clinical staff  Tobacco  Allergies  Meds   Med Hx  Surg Hx  Fam Hx  Soc Hx        Reviewed and updated as needed this visit by Provider               "      Review of Systems   Constitutional: Negative for chills and fever.   HENT: Negative for congestion, ear pain, hearing loss and sore throat.    Eyes: Negative for pain and visual disturbance.   Respiratory: Negative for cough and shortness of breath.    Cardiovascular: Negative for chest pain, palpitations and peripheral edema.   Gastrointestinal: Negative for abdominal pain, constipation, diarrhea, heartburn, hematochezia and nausea.   Genitourinary: Negative for discharge, dysuria, frequency, genital sores, hematuria, impotence and urgency.   Musculoskeletal: Negative for arthralgias, joint swelling and myalgias.   Skin: Negative for rash.   Neurological: Negative for dizziness, weakness, headaches and paresthesias.   Psychiatric/Behavioral: Negative for mood changes. The patient is not nervous/anxious.          OBJECTIVE:   /88 (BP Location: Right arm, Patient Position: Chair, Cuff Size: Adult Regular)   Pulse 80   Temp 98.1  F (36.7  C) (Temporal)   Resp 14   Ht 1.803 m (5' 11\")   Wt 138.5 kg (305 lb 4 oz)   SpO2 94%   BMI 42.57 kg/m      Physical Exam  Constitutional:       General: He is not in acute distress.     Appearance: He is well-developed. He is morbidly obese.   HENT:      Head: Normocephalic and atraumatic.      Right Ear: Hearing, tympanic membrane, ear canal and external ear normal.      Left Ear: Hearing, tympanic membrane, ear canal and external ear normal.      Nose: Nose normal.      Mouth/Throat:      Mouth: No oral lesions.      Pharynx: Uvula midline. No oropharyngeal exudate.   Eyes:      General: Lids are normal. No scleral icterus.        Right eye: No discharge.         Left eye: No discharge.      Conjunctiva/sclera: Conjunctivae normal.      Pupils: Pupils are equal, round, and reactive to light.   Neck:      Musculoskeletal: Normal range of motion and neck supple.      Thyroid: No thyroid mass or thyromegaly.      Trachea: No tracheal deviation.   Cardiovascular:     " " Rate and Rhythm: Normal rate and regular rhythm.      Pulses: Normal pulses.      Heart sounds: Normal heart sounds, S1 normal and S2 normal. No murmur. No S3 or S4 sounds.    Pulmonary:      Effort: Pulmonary effort is normal. No respiratory distress.      Breath sounds: Normal breath sounds. No wheezing or rales.   Abdominal:      General: Bowel sounds are normal. There is no distension.      Palpations: Abdomen is soft. There is no mass.      Tenderness: There is no abdominal tenderness. There is no guarding.   Musculoskeletal: Normal range of motion.         General: No deformity.   Lymphadenopathy:      Cervical: No cervical adenopathy.      Upper Body:      Right upper body: No supraclavicular adenopathy.      Left upper body: No supraclavicular adenopathy.   Skin:     General: Skin is warm and dry.      Findings: No lesion or rash.   Neurological:      Mental Status: He is alert and oriented to person, place, and time.      Motor: No abnormal muscle tone.      Deep Tendon Reflexes: Reflexes are normal and symmetric.   Psychiatric:         Speech: Speech normal.         Thought Content: Thought content normal.         Judgment: Judgment normal.               ASSESSMENT/PLAN:       ICD-10-CM    1. Routine general medical examination at a health care facility  Z00.00    2. Chronic rhinitis  J31.0 montelukast (SINGULAIR) 10 MG tablet   3. Morbid obesity (H)  E66.01 GLUCOSE     Lipid panel reflex to direct LDL Fasting   4. Obstructive sleep apnea syndrome  G47.33 SLEEP EVALUATION & MANAGEMENT REFERRAL - [facility] [phone] (Age 13 and up if over 100 lbs)       Patient has been advised of split billing requirements and indicates understanding: Yes  COUNSELING:   Reviewed preventive health counseling, as reflected in patient instructions    Estimated body mass index is 42.57 kg/m  as calculated from the following:    Height as of this encounter: 1.803 m (5' 11\").    Weight as of " this encounter: 138.5 kg (305 lb 4 oz).     Weight management plan: Discussed healthy diet and exercise guidelines    He reports that he has never smoked. He has quit using smokeless tobacco.      Counseling Resources:  ATP IV Guidelines  Pooled Cohorts Equation Calculator  FRAX Risk Assessment  ICSI Preventive Guidelines  Dietary Guidelines for Americans, 2010  USDA's MyPlate  ASA Prophylaxis  Lung CA Screening    Timothy Lozano MD  [facility]

## 2021-06-25 NOTE — PATIENT INSTRUCTIONS
Preventive Health Recommendations  Male Ages 50 - 64    Yearly exam:             See your health care provider every year in order to  o   Review health changes.   o   Discuss preventive care.    o   Review your medicines if your doctor has prescribed any.     Have a cholesterol test every 5 years, or more frequently if you are at risk for high cholesterol/heart disease.     Have a diabetes test (fasting glucose) every three years. If you are at risk for diabetes, you should have this test more often.     Have a colonoscopy at age 50, or have a yearly FIT test (stool test). These exams will check for colon cancer.      Talk with your health care provider about whether or not a prostate cancer screening test (PSA) is right for you.    You should be tested each year for STDs (sexually transmitted diseases), if you re at risk.     Shots: Get a flu shot each year. Get a tetanus shot every 10 years.     Nutrition:    Eat at least 5 servings of fruits and vegetables daily.     Eat whole-grain bread, whole-wheat pasta and brown rice instead of white grains and rice.     Get adequate Calcium and Vitamin D.     Lifestyle    Exercise for at least 150 minutes a week (30 minutes a day, 5 days a week). This will help you control your weight and prevent disease.     Limit alcohol to one drink per day.     No smoking.     Wear sunscreen to prevent skin cancer.     See your dentist every six months for an exam and cleaning.     See your eye doctor every 1 to 2 years.    My Fitness Pal:  Load this onto your smart phone or go online.  www.myfitnesspal.Slidely. Make your goal of 1-2 pounds of weight loss per week.     Psyllium fiber (metamucil) work up to 2 doses twice daily.  Needs to be the powdered version that you add to liquid NOT the tablets

## 2021-06-27 DIAGNOSIS — R73.01 ELEVATED FASTING BLOOD SUGAR: Primary | ICD-10-CM

## 2021-06-28 NOTE — RESULT ENCOUNTER NOTE
John,  Your results show your cholesterol is elevated and cholesterol lowering medication may be of benefit to you, but I would recommend having a follow-up telephone, video, or office-based visit to discuss this further if you are interested.  Your blood sugar is elevated high enough that I would recommend a follow-up screen for diabetes.  You should come back for a lab only visit in 1-2 weeks for a test called a hemoglobin A1c.  I placed the order, you just need to schedule the lab appointment.  Please let me know if you have any questions.    Sincerely,  Dr. Lozano

## 2021-07-09 DIAGNOSIS — R73.01 ELEVATED FASTING BLOOD SUGAR: ICD-10-CM

## 2021-07-09 LAB — HBA1C MFR BLD: 6.2 % (ref 0–5.6)

## 2021-07-09 PROCEDURE — 36415 COLL VENOUS BLD VENIPUNCTURE: CPT | Performed by: FAMILY MEDICINE

## 2021-07-09 PROCEDURE — 83036 HEMOGLOBIN GLYCOSYLATED A1C: CPT | Performed by: FAMILY MEDICINE

## 2021-07-16 NOTE — RESULT ENCOUNTER NOTE
John,  Your results show that you are very close to be diabetic (hemoglobin A1c of 6.5 makes the diagnosis).  I recommend you work on your health habits that we discussed at your preventative visit.  Please let me know if you have any questions.    Sincerely,  Dr. Lozano

## 2021-08-06 RX ORDER — SILDENAFIL 100 MG/1
TABLET, FILM COATED ORAL
Qty: 10 TABLET | Refills: 0 | OUTPATIENT
Start: 2021-08-06

## 2021-08-06 NOTE — TELEPHONE ENCOUNTER
This medication has not been sent out in 8 months. Please forward to PCP to see if he needs to see the patient again prior to prescribing.    Chavez Wilder PA-C on 8/6/2021 at 5:06 PM

## 2021-08-06 NOTE — TELEPHONE ENCOUNTER
"Routing refill request to provider for review/approval because:  Drug not active on patient's medication list      Requested Prescriptions   Pending Prescriptions Disp Refills     sildenafil (VIAGRA) 100 MG tablet       Sig: TAKE ONE TABLET BY MOUTH ONCE DAILY IF NEEDED TAKE 30MINUTES TO 4HRS BEFORE -MAX 100MG / 24HR   Last Written Prescription Date: NA    Last Fill Quantity: NA,  # refills: NA   Last office visit: 6/25/2021 with prescribing provider:     Future Office Visit:        Erectile Dysfuction Protocol Failed - 8/5/2021  4:59 PM        Failed - Medication is active on med list        Passed - Absence of nitrates on medication list        Passed - Absence of Alpha Blockers on Med list        Passed - Recent (12 mo) or future (30 days) visit within the authorizing provider's specialty     Patient has had an office visit with the authorizing provider or a provider within the authorizing providers department within the previous 12 mos or has a future within next 30 days. See \"Patient Info\" tab in inbasket, or \"Choose Columns\" in Meds & Orders section of the refill encounter.              Passed - Patient is age 18 or older         Kayleen Gonsales RN    "

## 2021-08-09 NOTE — TELEPHONE ENCOUNTER
I don't believe I have ever prescribed this medication for him.  We should discuss it first via a telephone, video, or office-based visit.      Will have staff notify patient.    Timothy Lozano MD

## 2021-08-13 RX ORDER — SILDENAFIL 100 MG/1
TABLET, FILM COATED ORAL
Qty: 10 TABLET | Refills: 0 | OUTPATIENT
Start: 2021-08-13

## 2021-08-13 NOTE — TELEPHONE ENCOUNTER
"Routing refill request to provider for review/approval because:  Drug not active on patient's medication list      Requested Prescriptions   Pending Prescriptions Disp Refills     sildenafil (VIAGRA) 100 MG tablet [Pharmacy Med Name: SILDENAFIL CITRATE 100MG TABS] 10 tablet 11     Sig: TAKE ONE TABLET BY MOUTH ONCE DAILY IF NEEDED TAKE 30MINUTES TO 4HRS BEFORE -MAX 100MG / 24HR   Last Written Prescription Date:  NA  Last Fill Quantity: NA,  # refills: NA   Last office visit: 6/25/2021 with prescribing provider:     Future Office Visit:        Erectile Dysfuction Protocol Failed - 8/11/2021  6:01 PM        Failed - Medication is active on med list        Passed - Absence of nitrates on medication list        Passed - Absence of Alpha Blockers on Med list        Passed - Recent (12 mo) or future (30 days) visit within the authorizing provider's specialty     Patient has had an office visit with the authorizing provider or a provider within the authorizing providers department within the previous 12 mos or has a future within next 30 days. See \"Patient Info\" tab in inbasket, or \"Choose Columns\" in Meds & Orders section of the refill encounter.              Passed - Patient is age 18 or older         Kayleen Gonsales RN    "

## 2021-08-13 NOTE — TELEPHONE ENCOUNTER
Viagra has not been prescribed in our clinic before. Will need to see his primary care provider to discuss starting a prescription or request refill from previous prescriber.    Cristal Chaidez PA-C  [facility]

## 2021-10-10 ENCOUNTER — HEALTH MAINTENANCE LETTER (OUTPATIENT)
Age: 51
End: 2021-10-10

## 2021-12-03 ENCOUNTER — MYC MEDICAL ADVICE (OUTPATIENT)
Dept: FAMILY MEDICINE | Facility: CLINIC | Age: 51
End: 2021-12-03
Payer: COMMERCIAL

## 2021-12-03 DIAGNOSIS — E78.2 MIXED HYPERLIPIDEMIA: ICD-10-CM

## 2021-12-03 DIAGNOSIS — E66.01 MORBID OBESITY (H): ICD-10-CM

## 2021-12-03 DIAGNOSIS — R73.01 ELEVATED FASTING BLOOD SUGAR: Primary | ICD-10-CM

## 2021-12-06 NOTE — TELEPHONE ENCOUNTER
It looks like pt had borderline labs at last visit in June 2021. Do you want pt to fast for upcoming appt? Sheyla Mcclure, CMA

## 2021-12-06 NOTE — TELEPHONE ENCOUNTER
Let him know I ordered fasting labs for him and he can drink as much water as he wants.    Timothy Lozano MD

## 2021-12-14 ENCOUNTER — OFFICE VISIT (OUTPATIENT)
Dept: FAMILY MEDICINE | Facility: CLINIC | Age: 51
End: 2021-12-14
Payer: COMMERCIAL

## 2021-12-14 VITALS
WEIGHT: 300.5 LBS | TEMPERATURE: 97.4 F | OXYGEN SATURATION: 96 % | BODY MASS INDEX: 41.91 KG/M2 | RESPIRATION RATE: 18 BRPM | HEART RATE: 101 BPM | SYSTOLIC BLOOD PRESSURE: 132 MMHG | DIASTOLIC BLOOD PRESSURE: 88 MMHG

## 2021-12-14 DIAGNOSIS — E66.01 MORBID OBESITY (H): ICD-10-CM

## 2021-12-14 DIAGNOSIS — E11.65 TYPE 2 DIABETES MELLITUS WITH HYPERGLYCEMIA, WITHOUT LONG-TERM CURRENT USE OF INSULIN (H): Primary | ICD-10-CM

## 2021-12-14 DIAGNOSIS — Z23 NEED FOR PROPHYLACTIC VACCINATION AND INOCULATION AGAINST INFLUENZA: ICD-10-CM

## 2021-12-14 DIAGNOSIS — N52.2 DRUG-INDUCED ERECTILE DYSFUNCTION: ICD-10-CM

## 2021-12-14 DIAGNOSIS — R35.0 FREQUENT URINATION: ICD-10-CM

## 2021-12-14 DIAGNOSIS — R73.01 ELEVATED FASTING BLOOD SUGAR: ICD-10-CM

## 2021-12-14 DIAGNOSIS — F41.1 GAD (GENERALIZED ANXIETY DISORDER): ICD-10-CM

## 2021-12-14 DIAGNOSIS — R63.1 POLYDIPSIA: ICD-10-CM

## 2021-12-14 LAB
ALBUMIN SERPL-MCNC: 4 G/DL (ref 3.4–5)
ALBUMIN UR-MCNC: ABNORMAL MG/DL
ALP SERPL-CCNC: 155 U/L (ref 40–150)
ALT SERPL W P-5'-P-CCNC: 68 U/L (ref 0–70)
ANION GAP SERPL CALCULATED.3IONS-SCNC: 6 MMOL/L (ref 3–14)
APPEARANCE UR: CLEAR
AST SERPL W P-5'-P-CCNC: 40 U/L (ref 0–45)
BILIRUB SERPL-MCNC: 1 MG/DL (ref 0.2–1.3)
BILIRUB UR QL STRIP: ABNORMAL
BUN SERPL-MCNC: 16 MG/DL (ref 7–30)
CALCIUM SERPL-MCNC: 9.6 MG/DL (ref 8.5–10.1)
CHLORIDE BLD-SCNC: 102 MMOL/L (ref 94–109)
CHOLEST SERPL-MCNC: 264 MG/DL
CO2 SERPL-SCNC: 27 MMOL/L (ref 20–32)
COLOR UR AUTO: YELLOW
CREAT SERPL-MCNC: 0.74 MG/DL (ref 0.66–1.25)
FASTING STATUS PATIENT QL REPORTED: YES
GFR SERPL CREATININE-BSD FRML MDRD: >90 ML/MIN/1.73M2
GLUCOSE BLD-MCNC: 307 MG/DL (ref 70–99)
GLUCOSE UR STRIP-MCNC: 500 MG/DL
HBA1C MFR BLD: 11.3 % (ref 0–5.6)
HDLC SERPL-MCNC: 32 MG/DL
HGB UR QL STRIP: NEGATIVE
KETONES UR STRIP-MCNC: >=80 MG/DL
LDLC SERPL CALC-MCNC: 175 MG/DL
LEUKOCYTE ESTERASE UR QL STRIP: NEGATIVE
MUCOUS THREADS #/AREA URNS LPF: PRESENT /LPF
NITRATE UR QL: NEGATIVE
NONHDLC SERPL-MCNC: 232 MG/DL
PH UR STRIP: 5.5 [PH] (ref 5–7)
POTASSIUM BLD-SCNC: 4.6 MMOL/L (ref 3.4–5.3)
PROT SERPL-MCNC: 8.6 G/DL (ref 6.8–8.8)
RBC URINE: <1 /HPF
SODIUM SERPL-SCNC: 135 MMOL/L (ref 133–144)
SP GR UR STRIP: 1.02 (ref 1–1.03)
SQUAMOUS EPITHELIAL: <1 /HPF
TRIGL SERPL-MCNC: 284 MG/DL
UROBILINOGEN UR STRIP-MCNC: NORMAL MG/DL
WBC URINE: <1 /HPF

## 2021-12-14 PROCEDURE — 81001 URINALYSIS AUTO W/SCOPE: CPT | Performed by: FAMILY MEDICINE

## 2021-12-14 PROCEDURE — 80061 LIPID PANEL: CPT | Performed by: FAMILY MEDICINE

## 2021-12-14 PROCEDURE — 90471 IMMUNIZATION ADMIN: CPT | Performed by: FAMILY MEDICINE

## 2021-12-14 PROCEDURE — 90682 RIV4 VACC RECOMBINANT DNA IM: CPT | Performed by: FAMILY MEDICINE

## 2021-12-14 PROCEDURE — 80053 COMPREHEN METABOLIC PANEL: CPT | Performed by: FAMILY MEDICINE

## 2021-12-14 PROCEDURE — 36415 COLL VENOUS BLD VENIPUNCTURE: CPT | Performed by: FAMILY MEDICINE

## 2021-12-14 PROCEDURE — 99215 OFFICE O/P EST HI 40 MIN: CPT | Mod: 25 | Performed by: FAMILY MEDICINE

## 2021-12-14 PROCEDURE — 83036 HEMOGLOBIN GLYCOSYLATED A1C: CPT | Performed by: FAMILY MEDICINE

## 2021-12-14 RX ORDER — VENLAFAXINE HYDROCHLORIDE 150 MG/1
150 CAPSULE, EXTENDED RELEASE ORAL DAILY
Qty: 90 CAPSULE | Refills: 4 | Status: SHIPPED | OUTPATIENT
Start: 2021-12-14 | End: 2022-10-13

## 2021-12-14 RX ORDER — SILDENAFIL 100 MG/1
TABLET, FILM COATED ORAL
Qty: 10 TABLET | Refills: 5 | Status: SHIPPED | OUTPATIENT
Start: 2021-12-14 | End: 2022-10-13

## 2021-12-14 ASSESSMENT — PAIN SCALES - GENERAL: PAINLEVEL: NO PAIN (0)

## 2021-12-14 NOTE — PROGRESS NOTES
Assessment & Plan     ASSESSMENT/ORDERS:    ICD-10-CM    1. Type 2 diabetes mellitus with hyperglycemia, without long-term current use of insulin (H)  E11.65    2. Frequent urination  R35.0 Hemoglobin A1c     UA Macro with Reflex to Micro and Culture - lab collect     Hemoglobin A1c     UA Macro with Reflex to Micro and Culture - lab collect   3. Polydipsia  R63.1 UA Macro with Reflex to Micro and Culture - lab collect     UA Macro with Reflex to Micro and Culture - lab collect   4. Elevated fasting blood sugar  R73.01 Hemoglobin A1c     Hemoglobin A1c   5. Morbid obesity (H)  E66.01 Hemoglobin A1c     Lipid panel reflex to direct LDL Fasting     Comprehensive metabolic panel (BMP + Alb, Alk Phos, ALT, AST, Total. Bili, TP)     Hemoglobin A1c     Lipid panel reflex to direct LDL Fasting     Comprehensive metabolic panel (BMP + Alb, Alk Phos, ALT, AST, Total. Bili, TP)   6. GUS (generalized anxiety disorder)  F41.1 venlafaxine (EFFEXOR-XR) 150 MG 24 hr capsule   7. Drug-induced erectile dysfunction  N52.2 sildenafil (VIAGRA) 100 MG tablet   8. Need for prophylactic vaccination and inoculation against influenza  Z23 INFLUENZA QUAD, RECOMBINANT, P-FREE (RIV4) (FLUBLOK)     PLAN:  1.  Urinary frequency in a morbidly obese patient with history of elevated fasting blood sugar.  Concern for diabetes.  Labs as above.  2.  Refilled medications and will continue to manage prescriptions going forward to noted above issues.   3.  After patient left office and prior to end of clinic day, I reviewed patient's lab results as noted below.  Message sent to patient stating that he does indeed have diabetes, I recommended diabetic ed and then a follow-up appointment with me after he has that appointment to review his labs in more detail as well as recommended medication treatments for decreasing comorbid conditions.          41 minutes spent on the date of the encounter doing chart review, review of test results, interpretation of  "tests, patient visit and documentation        BMI:   Estimated body mass index is 41.91 kg/m  as calculated from the following:    Height as of 6/25/21: 1.803 m (5' 11\").    Weight as of this encounter: 136.3 kg (300 lb 8 oz).           Return in about 3 weeks (around 1/4/2022) for review of diabetes test results.    Timothy Lozano MD  [facility] DIGNA Lopez is a 51 year old who presents for the following health issues     History of Present Illness       He eats 0-1 servings of fruits and vegetables daily.He consumes 1 sweetened beverage(s) daily.He exercises with enough effort to increase his heart rate 30 to 60 minutes per day.  He exercises with enough effort to increase his heart rate 4 days per week.   He is taking medications regularly.       Patient is concerned with increased urination and excessive thirst for the last 7 weeks. Patient was borderline diabetic in the past and morbidly obese. States he starting using plexus slim program and is drinking a lot more fluids with this then he has before. No pain during urination.        He also wants his venlafaxine continued to be filled by me.  Was seeing a psychiatry provider but his generalized anxiety disorder has been stable.      Having issues with erectile dysfunction.  Has been on Viagra in the past and found it helpful.      Review of Systems         Objective    /88   Pulse 101   Temp 97.4  F (36.3  C) (Temporal)   Resp 18   Wt 136.3 kg (300 lb 8 oz)   SpO2 96%   BMI 41.91 kg/m    Body mass index is 41.91 kg/m .  Physical Exam  Constitutional:       Appearance: He is well-developed. He is morbidly obese.   Cardiovascular:      Rate and Rhythm: Normal rate and regular rhythm.      Heart sounds: Normal heart sounds, S1 normal and S2 normal. No murmur heard.      Pulmonary:      Effort: Pulmonary effort is normal. No respiratory distress.      Breath sounds: Normal breath sounds. No wheezing, rhonchi or " loli.   Neurological:      Mental Status: He is alert.   Psychiatric:         Attention and Perception: Attention normal.         Mood and Affect: Mood and affect normal.         Speech: Speech normal.         Behavior: Behavior normal.         Cognition and Memory: Cognition and memory normal.            Results for orders placed or performed in visit on 12/14/21 (from the past 24 hour(s))   Hemoglobin A1c   Result Value Ref Range    Hemoglobin A1C 11.3 (H) 0.0 - 5.6 %    Narrative    Results confirmed by repeat test.   Lipid panel reflex to direct LDL Fasting   Result Value Ref Range    Cholesterol 264 (H) <200 mg/dL    Triglycerides 284 (H) <150 mg/dL    Direct Measure HDL 32 (L) >=40 mg/dL    LDL Cholesterol Calculated 175 (H) <=100 mg/dL    Non HDL Cholesterol 232 (H) <130 mg/dL    Patient Fasting > 8hrs? Yes     Narrative    Cholesterol  Desirable:  <200 mg/dL    Triglycerides  Normal:  Less than 150 mg/dL  Borderline High:  150-199 mg/dL  High:  200-499 mg/dL  Very High:  Greater than or equal to 500 mg/dL    Direct Measure HDL  Female:  Greater than or equal to 50 mg/dL   Male:  Greater than or equal to 40 mg/dL    LDL Cholesterol  Desirable:  <100mg/dL  Above Desirable:  100-129 mg/dL   Borderline High:  130-159 mg/dL   High:  160-189 mg/dL   Very High:  >= 190 mg/dL    Non HDL Cholesterol  Desirable:  130 mg/dL  Above Desirable:  130-159 mg/dL  Borderline High:  160-189 mg/dL  High:  190-219 mg/dL  Very High:  Greater than or equal to 220 mg/dL   Comprehensive metabolic panel (BMP + Alb, Alk Phos, ALT, AST, Total. Bili, TP)   Result Value Ref Range    Sodium 135 133 - 144 mmol/L    Potassium 4.6 3.4 - 5.3 mmol/L    Chloride 102 94 - 109 mmol/L    Carbon Dioxide (CO2) 27 20 - 32 mmol/L    Anion Gap 6 3 - 14 mmol/L    Urea Nitrogen 16 7 - 30 mg/dL    Creatinine 0.74 0.66 - 1.25 mg/dL    Calcium 9.6 8.5 - 10.1 mg/dL    Glucose 307 (H) 70 - 99 mg/dL    Alkaline Phosphatase 155 (H) 40 - 150 U/L    AST 40 0 -  45 U/L    ALT 68 0 - 70 U/L    Protein Total 8.6 6.8 - 8.8 g/dL    Albumin 4.0 3.4 - 5.0 g/dL    Bilirubin Total 1.0 0.2 - 1.3 mg/dL    GFR Estimate >90 >60 mL/min/1.73m2   UA Macro with Reflex to Micro and Culture - lab collect    Specimen: Urine, NOS   Result Value Ref Range    Color Urine Yellow Colorless, Straw, Light Yellow, Yellow    Appearance Urine Clear Clear    Glucose Urine 500  mg/dL    Bilirubin Urine Small (A) Negative    Ketones Urine >=80 mg/dL    Specific Gravity Urine 1.025 1.003 - 1.035    Blood Urine Negative Negative    pH Urine 5.5 5.0 - 7.0    Protein Albumin Urine Trace (A) Negative mg/dL    Urobilinogen Urine Normal Normal, 2.0 mg/dL    Nitrite Urine Negative Negative    Leukocyte Esterase Urine Negative Negative    Mucus Urine Present (A) None Seen /LPF    RBC Urine <1 <=2 /HPF    WBC Urine <1 <=5 /HPF    Squamous Epithelials Urine <1 <=1 /HPF    Narrative    Urine Culture not indicated  Urine Culture not indicated

## 2021-12-14 NOTE — RESULT ENCOUNTER NOTE
John,  Your results unfortunately show you have diabetes.  I placed an order for our diabetic educator to contact you and set up an initial appointment to discuss health habits to help with management of this as well as learning how to check your blood sugars.  Within the next few weeks, you should plan to set up a telephone, video, or office-based visit to discuss what medications are recommended to help decrease risk of complications from diabetes.    Sincerely,  Dr. Lozano

## 2021-12-23 ENCOUNTER — ALLIED HEALTH/NURSE VISIT (OUTPATIENT)
Dept: EDUCATION SERVICES | Facility: OTHER | Age: 51
End: 2021-12-23
Payer: COMMERCIAL

## 2021-12-23 ENCOUNTER — TELEPHONE (OUTPATIENT)
Dept: EDUCATION SERVICES | Facility: OTHER | Age: 51
End: 2021-12-23

## 2021-12-23 DIAGNOSIS — E11.65 TYPE 2 DIABETES MELLITUS WITH HYPERGLYCEMIA, WITHOUT LONG-TERM CURRENT USE OF INSULIN (H): ICD-10-CM

## 2021-12-23 DIAGNOSIS — E11.65 TYPE 2 DIABETES MELLITUS WITH HYPERGLYCEMIA, WITHOUT LONG-TERM CURRENT USE OF INSULIN (H): Primary | ICD-10-CM

## 2021-12-23 PROCEDURE — G0108 DIAB MANAGE TRN  PER INDIV: HCPCS | Performed by: DIETITIAN, REGISTERED

## 2021-12-23 RX ORDER — BLOOD SUGAR DIAGNOSTIC
STRIP MISCELLANEOUS
Qty: 200 STRIP | Refills: 4 | Status: SHIPPED | OUTPATIENT
Start: 2021-12-23 | End: 2022-01-05

## 2021-12-23 RX ORDER — LANCETS
EACH MISCELLANEOUS
Qty: 200 EACH | Refills: 4 | Status: SHIPPED | OUTPATIENT
Start: 2021-12-23 | End: 2022-01-05

## 2021-12-23 NOTE — PATIENT INSTRUCTIONS
1).   before meals and less than 180 (2) hours after the start of a meal    Fasting, Before and (2) hours after the start of  1 meal per day

## 2021-12-23 NOTE — LETTER
12/23/2021         RE: John Price  [address] [address] [place] [id number]        Dear Colleague,    Thank you for referring your patient, John Price, to the [facility]. Please see a copy of my visit note below.    Works night     3 pm and 5 am     Eat lunch at 11 pm.      Water     Really thirsty     Started taking Plexis    Did start to exercising     Losing weight    Changed food some    Stress    Not as thristy, going to the [facility] less    [facility]s in [place]    90 welldyne rx    Supply  More needles more strips    Before supperr      Date Breakfast  Lunch  Dinner  Bedtime    Before After Before After Before After    12/23 252 *** *** *** *** *** ***   12/22 262 *** *** *** *** *** 197   12/21 257 *** *** *** *** *** 246   12/20 250 *** *** *** *** *** 302   12/19 245 *** *** *** 338 *** ***   12/18 277 *** *** *** 348 *** ***   12/17 12/16 224 *** *** *** 385  305 *** ***                    General

## 2021-12-23 NOTE — PROGRESS NOTES
"Diabetes Self-Management Education & Support    Presents for: Initial Assessment for new diagnosis    SUBJECTIVE/OBJECTIVE:  Presents for: Initial Assessment for new diagnosis  Accompanied by: Spouse,Daughter  Diabetes education in the past 24mo: No  Focus of Visit: Monitoring,Taking Medication,Diabetes Pathophysiology  Diabetes type: Type 2  Date of diagnosis: 2021  Disease course: Other  How confident are you filling out medical forms by yourself:: Not Assessed  Difficulty affording diabetes testing supplies?: No  Other concerns:: None  Cultural Influences/Ethnic Background:  Not  or       Diabetes Symptoms & Complications:  Fatigue: Yes  Neuropathy: Sometimes  Polydipsia: Yes  Polyphagia: Sometimes  Polyuria: Yes  Visual change: No  Slow healing wounds: Sometimes  Symptom course: Improving  Weight trend: Fluctuating  Complications assessed today?: Yes  Autonomic neuropathy: No  CVA: No  Heart disease: No  Nephropathy: No  Peripheral neuropathy: No  Peripheral Vascular Disease: No  Retinopathy: No  Sexual dysfunction: Other    Patient Problem List and Family Medical History reviewed for relevant medical history, current medical status, and diabetes risk factors.    Vitals:  There were no vitals taken for this visit.  Estimated body mass index is 41.91 kg/m  as calculated from the following:    Height as of 6/25/21: 1.803 m (5' 11\").    Weight as of 12/14/21: 136.3 kg (300 lb 8 oz).   Last 3 BP:   BP Readings from Last 3 Encounters:   12/14/21 132/88   06/25/21 126/88   12/11/20 130/82       History   Smoking Status     Never Smoker   Smokeless Tobacco     Former User       Labs:  Lab Results   Component Value Date    A1C 11.3 12/14/2021    A1C 6.2 07/09/2021     Lab Results   Component Value Date     12/14/2021     06/25/2021     Lab Results   Component Value Date     12/14/2021     06/25/2021     HDL Cholesterol   Date Value Ref Range Status   06/25/2021 36 (L) >39 mg/dL " Final     Direct Measure HDL   Date Value Ref Range Status   12/14/2021 32 (L) >=40 mg/dL Final   ]  GFR Estimate   Date Value Ref Range Status   12/14/2021 >90 >60 mL/min/1.73m2 Final     Comment:     As of July 11, 2021, eGFR is calculated by the CKD-EPI creatinine equation, without race adjustment. eGFR can be influenced by muscle mass, exercise, and diet. The reported eGFR is an estimation only and is only applicable if the renal function is stable.     No results found for: GFRESTBLACK  Lab Results   Component Value Date    CR 0.74 12/14/2021     No results found for: MICROALBUMIN    Healthy Eating:  Healthy Eating Assessed Today: No  Cultural/Jewish diet restrictions?: No  Meal planning/habits: None  How many times a week on average do you eat food made away from home (restaurant/take-out)?: 1  Meals include: Lunch,Dinner,Afternoon Snack,Evening Snack  Beverages: Water,Sports drinks    Being Active:  Being Active Assessed Today: Yes  Days per week of moderate to strenuous exercise (like a brisk walk): 4  On average, minutes per day of exercise at this level: 120  How intense was your typical exercise? : Moderate (like brisk walking)  Exercise Minutes per Week: 480  Barrier to exercise: Time,Physical limitation    Monitoring:  Monitoring Assessed Today: Yes  Did patient bring glucose meter to appointment? : Yes  Blood Glucose Meter: Unknown  Times checking blood sugar at home (number): 2  Times checking blood sugar at home (per): Day  Blood glucose trend: No change    Date Breakfast  Lunch  Dinner  Bedtime    Before After Before After Before After    12/23 252         12/22 262      197   12/21 257      246   12/20 250      302   12/19 245    338     12/18 277    348     12/17 12/16 224    385  305           Taking Medications:      Current Treatments: None    Problem Solving:  Is the patient at risk for hypoglycemia?: No              Reducing Risks:  CAD Risks: Diabetes  Mellitus,Dyslipidemia,Obesity,Sedentary lifestyle,Stress  Has dilated eye exam at least once a year?: No  Sees dentist every 6 months?: Yes  Feet checked by healthcare provider in the last year?: No    Healthy Coping:  Informal Support system:: Children,Spouse  Stage of change: ACTION (Actively working towards change)  Patient Activation Measure Survey Score:  No flowsheet data found.    Diabetes knowledge and skills assessment:   Patient is knowledgeable in diabetes management concepts related to: Monitoring    Patient needs further education on the following diabetes management concepts: Healthy Eating, Being Active and Monitoring    Based on learning assessment above, most appropriate setting for further diabetes education would be: Individual setting.      INTERVENTIONS:    Education provided today on:  AADE Self-Care Behaviors:  Diabetes Pathophysiology  Healthy Eating: carbohydrate counting, consistency in amount, composition, and timing of food intake, plate planning method and label reading  Being Active: relationship to blood glucose, describe appropriate activity program and precautions to take  Monitoring: log and interpret results, individual blood glucose targets and frequency of monitoring  Taking Medication: action of prescribed medication and when to take medications    Opportunities for ongoing education and support in diabetes-self management were discussed.    Pt verbalized understanding of concepts discussed and recommendations provided today.       Education Materials Provided:  [facility] Healthy Living with Diabetes Book, Safe Disposal Options for Needles & Syringes, Carbohydrate Counting and My Plate Planner      ASSESSMENT:  Pt is newly dx with DM.  Blood sugars are improving some since changing some eating habits since dx.  Pt is not on any Diabetes medication.  Also had large ketones 12/14/21.  Since blood sugars are improving and symptoms related to high blood sugars are improving,  recommend starting metformin xr and checking to make sure ketones are no longer large.  Continue to follow and provide also new dx education.        Patient's most recent   Lab Results   Component Value Date    A1C 11.3 12/14/2021    A1C 6.2 07/09/2021    is not meeting goal of <7.0    PLAN  See Patient Instructions for co-developed, patient-stated behavior change goals.  AVS printed and provided to patient today. See Follow-Up section for recommended follow-up.      Time Spent: 60 minutes  Encounter Type: Individual    Any diabetes medication dose changes were made via the CDE Protocol and Collaborative Practice Agreement with the patient's referring provider. A copy of this encounter was shared with the provider.      Zainab Green RD [facility]  [phone]

## 2021-12-24 RX ORDER — METFORMIN HCL 500 MG
TABLET, EXTENDED RELEASE 24 HR ORAL
Qty: 120 TABLET | Refills: 1 | Status: SHIPPED | OUTPATIENT
Start: 2021-12-24 | End: 2021-12-28

## 2021-12-24 NOTE — TELEPHONE ENCOUNTER
Dr Lozano,    Pt's blood sugars are in mid-200s, improved but still elevated.  Symptoms related to high blood sugars improving as well.  Order pended for metformin XR for your review and signature.  Also ordered ketostix testing strips per scope to have pt check to make sure ketones are improving as he had large amount.      Thanks    Zainab Green RD LD [facility]  [phone]

## 2021-12-27 ENCOUNTER — MYC MEDICAL ADVICE (OUTPATIENT)
Dept: FAMILY MEDICINE | Facility: CLINIC | Age: 51
End: 2021-12-27
Payer: COMMERCIAL

## 2021-12-27 NOTE — TELEPHONE ENCOUNTER
I have scheduled the pt for 1/28/2021 at 1:20 pm. I have replied to pt's [employer] message with this appt. Sheyla Mcclure, CMA

## 2021-12-28 ENCOUNTER — TELEPHONE (OUTPATIENT)
Dept: EDUCATION SERVICES | Facility: OTHER | Age: 51
End: 2021-12-28
Payer: COMMERCIAL

## 2021-12-28 ENCOUNTER — MYC MEDICAL ADVICE (OUTPATIENT)
Dept: EDUCATION SERVICES | Facility: OTHER | Age: 51
End: 2021-12-28
Payer: COMMERCIAL

## 2021-12-28 DIAGNOSIS — E11.65 TYPE 2 DIABETES MELLITUS WITH HYPERGLYCEMIA, WITHOUT LONG-TERM CURRENT USE OF INSULIN (H): ICD-10-CM

## 2021-12-28 RX ORDER — METFORMIN HCL 500 MG
TABLET, EXTENDED RELEASE 24 HR ORAL
Qty: 120 TABLET | Refills: 1 | Status: SHIPPED | OUTPATIENT
Start: 2021-12-28 | End: 2022-02-17

## 2021-12-28 NOTE — TELEPHONE ENCOUNTER
Called pt and let him know that Metformin XR was send to [facility] and insurance filled it.  Let John know he will also receive it from [facility].  Pt is okay with this and will start taking metformin XR as soon as he picks it up.  Pt  will also continue to check ketones until trace or negative.      Asked pt to reach out on mychart in a week with updated to blood sugars.      Zainab Green RD [facility]  [phone]

## 2022-01-02 ENCOUNTER — MYC MEDICAL ADVICE (OUTPATIENT)
Dept: EDUCATION SERVICES | Facility: OTHER | Age: 52
End: 2022-01-02
Payer: COMMERCIAL

## 2022-01-02 ENCOUNTER — MYC MEDICAL ADVICE (OUTPATIENT)
Dept: FAMILY MEDICINE | Facility: CLINIC | Age: 52
End: 2022-01-02
Payer: COMMERCIAL

## 2022-01-02 DIAGNOSIS — J31.0 CHRONIC RHINITIS: ICD-10-CM

## 2022-01-02 DIAGNOSIS — E11.65 TYPE 2 DIABETES MELLITUS WITH HYPERGLYCEMIA, WITHOUT LONG-TERM CURRENT USE OF INSULIN (H): ICD-10-CM

## 2022-01-03 NOTE — TELEPHONE ENCOUNTER
Dr. Lozano,    I responded to MyChart message from John the uncontrolled or newly diagnosed diabetes can cause blurry vision. If he doesn't notice improvement in a few weeks he should be seen sooner than April.    I asked John to reach out to you with further questions about his vision.    Di Nino RN, [facility]

## 2022-01-05 ENCOUNTER — MYC MEDICAL ADVICE (OUTPATIENT)
Dept: EDUCATION SERVICES | Facility: OTHER | Age: 52
End: 2022-01-05
Payer: COMMERCIAL

## 2022-01-05 RX ORDER — MONTELUKAST SODIUM 10 MG/1
10 TABLET ORAL AT BEDTIME
Qty: 90 TABLET | Refills: 2 | Status: SHIPPED | OUTPATIENT
Start: 2022-01-05 | End: 2022-03-18

## 2022-01-05 RX ORDER — BLOOD SUGAR DIAGNOSTIC
STRIP MISCELLANEOUS
Qty: 200 STRIP | Refills: 4 | Status: SHIPPED | OUTPATIENT
Start: 2022-01-05 | End: 2022-01-19

## 2022-01-05 RX ORDER — LANCETS
EACH MISCELLANEOUS
Qty: 200 EACH | Refills: 4 | Status: SHIPPED | OUTPATIENT
Start: 2022-01-05

## 2022-01-05 NOTE — TELEPHONE ENCOUNTER
Sent medications from mail order to [facility]'s.  Closing this encounter.  ANDERSON HillmanN, RN

## 2022-01-06 NOTE — TELEPHONE ENCOUNTER
Diabetes Follow-up    Subjective/Objective:    John HERNÁN Price sent in blood glucose log for review. Last date of communication was: 1/2/22.    Diabetes is being managed with   Diabetes Medications   Diabetes Medication(s)     Biguanides       metFORMIN (GLUCOPHAGE-XR) 500 MG 24 hr tablet    Take 1 tablet with breakfast and 1 tablet with dinner.  In 3-5 days if tolerating, increase to 3 tablets and then 4 if continuing to tolerate.          BG/Food Log:       Assessment:  Blood sugars significantly improved since starting metformin.  He just started the max dose.  Recommend continued ketone testing until consistently trace.  No medication changes needed at this point.  Anticipate 2000mg will continue to drop blood sugars.     Fasting blood glucose: 0% in target.  Before dinner glucose: 100% in target, since starting metformin.  After dinner glucose: 83% in target.    Plan/Response:  See Patient Instructions for co-developed, patient-stated behavior change goals.  No changes in the patient's current treatment plan    Follow up call scheduled 1/21/22    Jailyn Rivera MS, RD, LD, CDE      Any diabetes medication dose changes were made via the CDE Protocol and Collaborative Practice Agreement with the patient's primary care provider. A copy of this encounter was shared with the provider.

## 2022-01-19 ENCOUNTER — VIRTUAL VISIT (OUTPATIENT)
Dept: EDUCATION SERVICES | Facility: OTHER | Age: 52
End: 2022-01-19
Payer: COMMERCIAL

## 2022-01-19 DIAGNOSIS — E11.65 TYPE 2 DIABETES MELLITUS WITH HYPERGLYCEMIA, WITHOUT LONG-TERM CURRENT USE OF INSULIN (H): ICD-10-CM

## 2022-01-19 PROCEDURE — G0108 DIAB MANAGE TRN  PER INDIV: HCPCS | Performed by: DIETITIAN, REGISTERED

## 2022-01-19 RX ORDER — BLOOD SUGAR DIAGNOSTIC
STRIP MISCELLANEOUS
Qty: 100 STRIP | Refills: 4 | Status: SHIPPED | OUTPATIENT
Start: 2022-01-19

## 2022-01-19 NOTE — PROGRESS NOTES
Diabetes Self-Management Education & Support    Presents for: Follow-up    Type of Visit: Telephone Visit     How would patient like to obtain AVS? Meka    ASSESSMENT:  Spoke with John over the phone today to follow up for new Dx.  He has been checking BG three times per day and is seeing some improvement with readings.  Has also been testing for ketones, and reports trace or negative.  Pt was advised that he could discontinue checking for ketones at this time.   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  Is limiting carbohydrate sources and portion sizes of these foods with meals.  Reports weight is under 290 lbs.  Is taking 2000 mg Metformin per day and tolerating this dose.  John is concerned today with reported blurred vision.  We discussed how this can be a symptom of high BG and even though his numbers are improving, his eyes could be adjusting.  He is due to see the eye DrEdy In the next couple months, but would like to avoid going in sooner due to insurance coverage.  Has an appointment with PCP next week, encouraged John to discuss with his physician about his vision then.          Patient's most recent   Lab Results   Component Value Date    A1C 11.3 12/14/2021    A1C 6.2 07/09/2021    is not meeting goal of <7.0    Diabetes knowledge and skills assessment:   Patient is knowledgeable in diabetes management concepts related to: Healthy Eating, Monitoring, Reducing Risks, Taking Medication and Problem Solving    Continue education with the following diabetes management concepts: Being Active, Taking Medication and Healthy Coping    Based on learning assessment above, most appropriate setting for further diabetes education would be: Individual setting.    INTERVENTIONS:    Education provided today on:  AADE Self-Care Behaviors:  Healthy Eating: carbohydrate counting, heart healthy diet, portion control and plate planning method  Monitoring: purpose, log and  interpret results, frequency of monitoring and ketone testing  Taking Medication: when to take medications  Problem Solving: high blood glucose - causes, signs/symptoms, treatment and prevention and when to call health care provider    Opportunities for ongoing education and support in diabetes-self management were discussed. Pt verbalized understanding of concepts discussed and recommendations provided today.       Education Materials Provided:  No new materials provided today      Goals Addressed as of 1/19/2022 at 4:00 PM                    Today       Monitoring (pt-stated)   On track    Added 1/19/22 by Zainab Green RD      My Goal: I will check BG 1-3 times per day and take note of foods that make BG high.    What I need to meet my goal: meter and testing supplies    I plan to meet my goal by this date: next PCP appointment.          PLAN      Topics to cover at upcoming visits: Being Active, Monitoring, Problem Solving and Healthy Coping  Follow-up: February 17, 2022    See Goals Section for co-developed, patient-stated behavior change goals.  AVS provided to patient today.          SUBJECTIVE / OBJECTIVE:  Presents for: Follow-up  Accompanied by: Self  Diabetes education in the past 24mo: No  Focus of Visit: Monitoring,Taking Medication,Diabetes Pathophysiology  Diabetes type: Type 2  Date of diagnosis: 2021  Disease course: Improving  How confident are you filling out medical forms by yourself:: Not Assessed  Diabetes management related comments/concerns: blurry vision- will see  Friday 1/28.  Would like to start exercise routine- wondering when and how he  can begin.  How often he should be checking BG.  Difficulty affording diabetes medication?: Yes (question on amount of test strips can get at once.)  Difficulty affording diabetes testing supplies?: No  Other concerns:: None  Cultural Influences/Ethnic Background:  Not  or       Diabetes Symptoms & Complications:  Fatigue: Yes  "(works night shift and thinks this may be contributing.)  Neuropathy: Sometimes  Polydipsia: No  Polyphagia: No  Polyuria: No  Visual change: Yes (blurred vision for the past 3-4 weeks)  Slow healing wounds: Sometimes  Symptom course: Improving  Weight trend: Decreasing  Complications assessed today?: Yes  Autonomic neuropathy: No  CVA: No  Heart disease: No  Nephropathy: No  Peripheral neuropathy: No  Peripheral Vascular Disease: No  Retinopathy: No  Sexual dysfunction: Other    Patient Problem List and Family Medical History reviewed for relevant medical history, current medical status, and diabetes risk factors.    Vitals:  There were no vitals taken for this visit.  Estimated body mass index is 41.91 kg/m  as calculated from the following:    Height as of 6/25/21: 1.803 m (5' 11\").    Weight as of 12/14/21: 136.3 kg (300 lb 8 oz).   Last 3 BP:   BP Readings from Last 3 Encounters:   12/14/21 132/88   06/25/21 126/88   12/11/20 130/82       History   Smoking Status     Never Smoker   Smokeless Tobacco     Former User       Labs:  Lab Results   Component Value Date    A1C 11.3 12/14/2021    A1C 6.2 07/09/2021     Lab Results   Component Value Date     12/14/2021     06/25/2021     Lab Results   Component Value Date     12/14/2021     06/25/2021     HDL Cholesterol   Date Value Ref Range Status   06/25/2021 36 (L) >39 mg/dL Final     Direct Measure HDL   Date Value Ref Range Status   12/14/2021 32 (L) >=40 mg/dL Final   ]  GFR Estimate   Date Value Ref Range Status   12/14/2021 >90 >60 mL/min/1.73m2 Final     Comment:     As of July 11, 2021, eGFR is calculated by the CKD-EPI creatinine equation, without race adjustment. eGFR can be influenced by muscle mass, exercise, and diet. The reported eGFR is an estimation only and is only applicable if the renal function is stable.     No results found for: GFRESTBLACK  Lab Results   Component Value Date    CR 0.74 12/14/2021     No results found " for: MICROALBUMIN    Healthy Eating:  Healthy Eating Assessed Today: No  Cultural/Religion diet restrictions?: No  Meal planning/habits: Carb counting,Smaller portions  How many times a week on average do you eat food made away from home (restaurant/take-out)?: 1  Meals include: Lunch,Dinner,Afternoon Snack,Evening Snack (works night shift)  Lunch: 2:30-3p salad (lettuce salad, 20-25 CHO per salad), chicken, hamburger- no bun  Dinner: 10-10:30p sandwich (whole wheat bread with deli meat, cheese, farrell) cucumbers  Snacks: 7p beef/turkey sticks, raw veggies (carrots, cucumber) or fruit (cantelope, honeydew), cheese cubes, HB eggs  Other: avoiding cheeseburgers  Beverages: Water,Sports drinks (caffinated water)  Has patient met with a dietitian in the past?: Yes    Being Active:  Being Active Assessed Today: Yes  Exercise:: Currently not exercising (stopped due to high BG and blurred vision.)  Days per week of moderate to strenuous exercise (like a brisk walk): 0  Barrier to exercise: Time,Physical limitation,Other (blurred vision)    Monitoring:  Monitoring Assessed Today: Yes  Did patient bring glucose meter to appointment? : Yes  Blood Glucose Meter: Accu-chek  Times checking blood sugar at home (number): 3  Times checking blood sugar at home (per): Day  Blood glucose trend: No change    Glucose data:    Date Breakfast Dinner     Before Before After   1/18 145     1/17 158 98 131   1/16 122 95 101   1/15 148 93 103   1/14 112 103 --   1/13 127 91 101   1/12 137 99 111       Taking Medications:  Diabetes Medication(s)     Biguanides       metFORMIN (GLUCOPHAGE-XR) 500 MG 24 hr tablet    Take 1 tablet with breakfast and 1 tablet with dinner.  In 3-5 days if tolerating, increase to 3 tablets and then 4 if continuing to tolerate.          Current Treatments: None    Problem Solving:  Is the patient at risk for hypoglycemia?: No    Reducing Risks:  Reducing Risks Assessed Today: Yes  Diabetes Risks: Age over 45  years  CAD Risks: Diabetes Mellitus,Dyslipidemia,Obesity,Sedentary lifestyle,Stress  Has dilated eye exam at least once a year?: No  Sees dentist every 6 months?: Yes  Feet checked by healthcare provider in the last year?: No    Healthy Coping:  Informal Support system:: Children,Spouse  Stage of change: ACTION (Actively working towards change)  Patient Activation Measure Survey Score:  No flowsheet data found.          Time Spent: 45 minutes  Encounter Type: Individual        Any diabetes medication dose changes were made via the Certified Diabetes Care & Education Protocol in collaboration with the patient's referring provider.    Zainab Green RD LD [facility]  [phone]    Ana Maria Burrell RDN, LD

## 2022-01-19 NOTE — PATIENT INSTRUCTIONS
1)  Continue checking blood sugars 3 times per day   One fasting- first thing when you wake up (goal  mg/dL)   One before meal of choice and one 2 hours after the start of the same meal (<180 mg/dL)    2) Utilize www.myplate.gov as resource for balancing meal plate.  Continue small portions of whole grains and carbohydrates containing fiber such as fruit.  Include lean protein sources and low fat dairy (lower cholesterol).      Zainab Green RD [facility]  [phone]     Subjective   Joy Bueno is a 65 y.o.female who presents to the ED from [facility] with an abnormal lab result. The patient says she feels fine and that the only reason she is here is because she has a low hemoglobin level that is reported to be 6.7. The only symptom she is experiencing is fatigue. She denies any melena, bleeding, dizziness, syncope, shortness of breath, or fever. She is not anticoagulated. She says she has had blood transfusions last year and one around a month ago. Her past medical history is significant for diabetes, kidney disease, and hypertension. There are no other acute complaints at this time.               History provided by:  Patient  Illness   Location:  General  Quality:  Abnormal lab, hemoglobin 6.7.   Timing:  Constant  Progression:  Worsening  Chronicity:  Chronic  Context:  Her hemoglobin was low per [facility].   Relieved by:  Nothing  Worsened by:  Nothing  Associated symptoms: fatigue    Associated symptoms: no fever and no shortness of breath        Review of Systems   Constitutional: Positive for fatigue. Negative for fever.   Respiratory: Negative for shortness of breath.    Gastrointestinal: Negative for blood in stool.   Neurological: Negative for dizziness and syncope.   All other systems reviewed and are negative.      Past Medical History:   Diagnosis Date   • Anemia    • Anxiety    • Asthma    • Chronic kidney disease    • Diabetes mellitus (CMS/[facility])    • Diabetes mellitus, type II (CMS/HCC) 8/3/2018   • History of Clostridium difficile infection 8/3/2018   • History of respiratory failure, since off home oxygen 8/3/2018   • History of UTI 8/3/2018   • Hypertension    • Morbid obesity (CMS/[facility])    • Osteoarthritis    • Tinea capitis 8/3/2018       Allergies   Allergen Reactions   • Geodon [Ziprasidone Hcl] Unknown (See Comments)     PT DOESN'T REMEMBER   • Haldol [Haloperidol Lactate] Unknown (See Comments)     PT DOESN'T REMEMBER   • Seroquel [Quetiapine  Fumarate] Unknown (See Comments)     PT DOESN'T REMEMBER       Past Surgical History:   Procedure Laterality Date   •  SECTION     • COLONOSCOPY N/A 2017    Procedure: COLONOSCOPY;  Surgeon: Mark I Brunner, MD;  Location:  [facility];  Service:    • ENDOSCOPY N/A 2017    Procedure: ESOPHAGOGASTRODUODENOSCOPY ;  Surgeon: Velasquez Call MD;  Location:  [facility];  Service:    • HERNIA REPAIR         Family History   Problem Relation Age of Onset   • Cardiomyopathy Mother    • Stroke Father    • Diabetes Father        Social History     Social History   • Marital status:      Social History Main Topics   • Smoking status: Former Smoker     Packs/day: 0.25      Comment: unknown when quit, maybe last year   • Alcohol use No   • Drug use: No   • Sexual activity: No     Other Topics Concern   • Not on file     Social History Narrative    Mrs. Bueno is a 65 year old AA  female. She lives alone in [place] but has been in rehab for some time. She has a daughter. She does not have an advanced directive.         Objective   Physical Exam   Constitutional: She is oriented to person, place, and time. She appears well-developed and well-nourished. No distress.   HENT:   Head: Normocephalic and atraumatic.   Nose: Nose normal.   Eyes: Conjunctivae are normal. No scleral icterus.   Neck: Normal range of motion. Neck supple.   Cardiovascular: Normal rate, regular rhythm and normal heart sounds.    No murmur heard.  Pulmonary/Chest: Effort normal and breath sounds normal. No respiratory distress.   Abdominal: Soft. Bowel sounds are normal. There is no tenderness. There is no rebound and no guarding.   Musculoskeletal: Normal range of motion. She exhibits no edema.   Neurological: She is alert and oriented to person, place, and time.   Skin: Skin is warm and dry.   Psychiatric: She has a normal mood and affect. Her behavior is normal.   Nursing note and vitals  reviewed.      Procedures         ED Course     Reviewed records, reports of Hb 6.4.  Her previous values seem to be between 7 and 9 on old records.  We checked Hb here and she is 7.4.  Pt denies any symptoms and is not on blood thinners and not complaining of having a GI bleed.  She does not meet the criteria for transfusion as set forth by our facility.  Pt does not want to be admitted for transfusion.  Discussed this all with her.                MDM  Number of Diagnoses or Management Options  Anemia of chronic disease:      Amount and/or Complexity of Data Reviewed  Clinical lab tests: ordered and reviewed  Decide to obtain previous medical records or to obtain history from someone other than the patient: yes        Final diagnoses:   Anemia of chronic disease       Documentation assistance provided by abelardo Gonzalez.  Information recorded by the aroldoibdanielle was done at my direction and has been verified and validated by me.     Merritt Gonzalez  09/14/18 [id number]       Merritt Gonzalez  09/14/18 [id number]       Car Sharpe MD  09/14/18 [id number]

## 2022-01-19 NOTE — LETTER
1/19/2022         RE: John Price  [address] [address] [place] [address]  [place] [address]        Dear Colleague,    Thank you for referring your patient, John Price, to the [facility]. Please see a copy of my visit note below.    Diabetes Self-Management Education & Support    Presents for: Follow-up    Type of Visit: Telephone Visit     How would patient like to obtain AVS? MyChart    ASSESSMENT:  Spoke with John over the phone today to follow up for new Dx.  He has been checking BG three times per day and is seeing some improvement with readings.  Has also been testing for ketones, and reports trace or negative.  Pt was advised that he could discontinue checking for ketones at this time.   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  Is limiting carbohydrate sources and portion sizes of these foods with meals.  Reports weight is under 290 lbs.  Is taking 2000 mg Metformin per day and tolerating this dose.  John is concerned today with reported blurred vision.  We discussed how this can be a symptom of high BG and even though his numbers are improving, his eyes could be adjusting.  He is due to see the eye DrEdy In the next couple months, but would like to avoid going in sooner due to insurance coverage.  Has an appointment with PCP next week, encouraged John to discuss with his physician about his vision then.          Patient's most recent   Lab Results   Component Value Date    A1C 11.3 12/14/2021    A1C 6.2 07/09/2021    is not meeting goal of <7.0    Diabetes knowledge and skills assessment:   Patient is knowledgeable in diabetes management concepts related to: Healthy Eating, Monitoring, Reducing Risks, Taking Medication and Problem Solving    Continue education with the following diabetes management concepts: Being Active, Taking Medication and Healthy Coping    Based on learning assessment above, most appropriate setting for further  diabetes education would be: Individual setting.    INTERVENTIONS:    Education provided today on:  AADE Self-Care Behaviors:  Healthy Eating: carbohydrate counting, heart healthy diet, portion control and plate planning method  Monitoring: purpose, log and interpret results, frequency of monitoring and ketone testing  Taking Medication: when to take medications  Problem Solving: high blood glucose - causes, signs/symptoms, treatment and prevention and when to call health care provider    Opportunities for ongoing education and support in diabetes-self management were discussed. Pt verbalized understanding of concepts discussed and recommendations provided today.       Education Materials Provided:  No new materials provided today      Goals Addressed as of 1/19/2022 at 4:00 PM                    Today       Monitoring (pt-stated)   On track    Added 1/19/22 by Zainab Green RD      My Goal: I will check BG 1-3 times per day and take note of foods that make BG high.    What I need to meet my goal: meter and testing supplies    I plan to meet my goal by this date: next PCP appointment.          PLAN      Topics to cover at upcoming visits: Being Active, Monitoring, Problem Solving and Healthy Coping  Follow-up: February 17, 2022    See Goals Section for co-developed, patient-stated behavior change goals.  AVS provided to patient today.          SUBJECTIVE / OBJECTIVE:  Presents for: Follow-up  Accompanied by: Self  Diabetes education in the past 24mo: No  Focus of Visit: Monitoring,Taking Medication,Diabetes Pathophysiology  Diabetes type: Type 2  Date of diagnosis: 2021  Disease course: Improving  How confident are you filling out medical forms by yourself:: Not Assessed  Diabetes management related comments/concerns: blurry vision- will see  Friday 1/28.  Would like to start exercise routine- wondering when and how he  can begin.  How often he should be checking BG.  Difficulty affording diabetes  "medication?: Yes (question on amount of test strips can get at once.)  Difficulty affording diabetes testing supplies?: No  Other concerns:: None  Cultural Influences/Ethnic Background:  Not  or       Diabetes Symptoms & Complications:  Fatigue: Yes (works night shift and thinks this may be contributing.)  Neuropathy: Sometimes  Polydipsia: No  Polyphagia: No  Polyuria: No  Visual change: Yes (blurred vision for the past 3-4 weeks)  Slow healing wounds: Sometimes  Symptom course: Improving  Weight trend: Decreasing  Complications assessed today?: Yes  Autonomic neuropathy: No  CVA: No  Heart disease: No  Nephropathy: No  Peripheral neuropathy: No  Peripheral Vascular Disease: No  Retinopathy: No  Sexual dysfunction: Other    Patient Problem List and Family Medical History reviewed for relevant medical history, current medical status, and diabetes risk factors.    Vitals:  There were no vitals taken for this visit.  Estimated body mass index is 41.91 kg/m  as calculated from the following:    Height as of 6/25/21: 1.803 m (5' 11\").    Weight as of 12/14/21: 136.3 kg (300 lb 8 oz).   Last 3 BP:   BP Readings from Last 3 Encounters:   12/14/21 132/88   06/25/21 126/88   12/11/20 130/82       History   Smoking Status     Never Smoker   Smokeless Tobacco     Former User       Labs:  Lab Results   Component Value Date    A1C 11.3 12/14/2021    A1C 6.2 07/09/2021     Lab Results   Component Value Date     12/14/2021     06/25/2021     Lab Results   Component Value Date     12/14/2021     06/25/2021     HDL Cholesterol   Date Value Ref Range Status   06/25/2021 36 (L) >39 mg/dL Final     Direct Measure HDL   Date Value Ref Range Status   12/14/2021 32 (L) >=40 mg/dL Final   ]  GFR Estimate   Date Value Ref Range Status   12/14/2021 >90 >60 mL/min/1.73m2 Final     Comment:     As of July 11, 2021, eGFR is calculated by the CKD-EPI creatinine equation, without race adjustment. eGFR " can be influenced by muscle mass, exercise, and diet. The reported eGFR is an estimation only and is only applicable if the renal function is stable.     No results found for: GFRESTBLACK  Lab Results   Component Value Date    CR 0.74 12/14/2021     No results found for: MICROALBUMIN    Healthy Eating:  Healthy Eating Assessed Today: No  Cultural/Restoration diet restrictions?: No  Meal planning/habits: Carb counting,Smaller portions  How many times a week on average do you eat food made away from home (restaurant/take-out)?: 1  Meals include: Lunch,Dinner,Afternoon Snack,Evening Snack (works night shift)  Lunch: 2:30-3p salad (lettuce salad, 20-25 CHO per salad), chicken, hamburger- no bun  Dinner: 10-10:30p sandwich (whole wheat bread with deli meat, cheese, farrell) cucumbers  Snacks: 7p beef/turkey sticks, raw veggies (carrots, cucumber) or fruit (cantelope, honeydew), cheese cubes, HB eggs  Other: avoiding cheeseburgers  Beverages: Water,Sports drinks (caffinated water)  Has patient met with a dietitian in the past?: Yes    Being Active:  Being Active Assessed Today: Yes  Exercise:: Currently not exercising (stopped due to high BG and blurred vision.)  Days per week of moderate to strenuous exercise (like a brisk walk): 0  Barrier to exercise: Time,Physical limitation,Other (blurred vision)    Monitoring:  Monitoring Assessed Today: Yes  Did patient bring glucose meter to appointment? : Yes  Blood Glucose Meter: Accu-chek  Times checking blood sugar at home (number): 3  Times checking blood sugar at home (per): Day  Blood glucose trend: No change    Glucose data:    Date Breakfast Dinner     Before Before After   1/18 145     1/17 158 98 131   1/16 122 95 101   1/15 148 93 103   1/14 112 103 --   1/13 127 91 101   1/12 137 99 111       Taking Medications:  Diabetes Medication(s)     Biguanides       metFORMIN (GLUCOPHAGE-XR) 500 MG 24 hr tablet    Take 1 tablet with breakfast and 1 tablet with dinner.  In 3-5 days  if tolerating, increase to 3 tablets and then 4 if continuing to tolerate.          Current Treatments: None    Problem Solving:  Is the patient at risk for hypoglycemia?: No    Reducing Risks:  Reducing Risks Assessed Today: Yes  Diabetes Risks: Age over 45 years  CAD Risks: Diabetes Mellitus,Dyslipidemia,Obesity,Sedentary lifestyle,Stress  Has dilated eye exam at least once a year?: No  Sees dentist every 6 months?: Yes  Feet checked by healthcare provider in the last year?: No    Healthy Coping:  Informal Support system:: Children,Spouse  Stage of change: ACTION (Actively working towards change)  Patient Activation Measure Survey Score:  No flowsheet data found.          Time Spent: 45 minutes  Encounter Type: Individual        Any diabetes medication dose changes were made via the Certified Diabetes Care & Education Protocol in collaboration with the patient's referring provider.    ANGELICA [facility]  [phone]    Ana Maria Burrell RDN, LD

## 2022-01-21 DIAGNOSIS — E11.65 TYPE 2 DIABETES MELLITUS WITH HYPERGLYCEMIA, WITHOUT LONG-TERM CURRENT USE OF INSULIN (H): ICD-10-CM

## 2022-01-21 RX ORDER — URINE ACETONE TEST STRIPS
STRIP MISCELLANEOUS
Qty: 50 STRIP | Refills: 0 | OUTPATIENT
Start: 2022-01-21

## 2022-01-22 DIAGNOSIS — E11.65 TYPE 2 DIABETES MELLITUS WITH HYPERGLYCEMIA, WITHOUT LONG-TERM CURRENT USE OF INSULIN (H): ICD-10-CM

## 2022-01-25 RX ORDER — URINE ACETONE TEST STRIPS
STRIP MISCELLANEOUS
Qty: 50 STRIP | Refills: 0 | OUTPATIENT
Start: 2022-01-25

## 2022-02-16 DIAGNOSIS — E11.65 TYPE 2 DIABETES MELLITUS WITH HYPERGLYCEMIA, WITHOUT LONG-TERM CURRENT USE OF INSULIN (H): ICD-10-CM

## 2022-02-16 RX ORDER — URINE ACETONE TEST STRIPS
STRIP MISCELLANEOUS
Qty: 50 STRIP | Refills: 0 | Status: SHIPPED | OUTPATIENT
Start: 2022-02-16 | End: 2022-02-17

## 2022-02-16 NOTE — TELEPHONE ENCOUNTER
Ketostix      Last Written Prescription Date:  1/20/2022  Last Fill Quantity: 50,   # refills: 0  Last Office Visit: 12/14/2021  Future Office visit:    Next 5 appointments (look out 90 days)      Mar 18, 2022  7:00 AM  (Arrive by 6:45 AM)  Provider Visit with Timothy Lozano MD  [facility] [address] [place] [id number] [phone]             Routing refill request to provider for review/approval because:  Drug not on the [facility], [facility] or [facility] refill protocol or controlled substance

## 2022-02-17 ENCOUNTER — ALLIED HEALTH/NURSE VISIT (OUTPATIENT)
Dept: EDUCATION SERVICES | Facility: OTHER | Age: 52
End: 2022-02-17
Payer: COMMERCIAL

## 2022-02-17 DIAGNOSIS — E11.65 TYPE 2 DIABETES MELLITUS WITH HYPERGLYCEMIA, WITHOUT LONG-TERM CURRENT USE OF INSULIN (H): Primary | ICD-10-CM

## 2022-02-17 PROCEDURE — G0108 DIAB MANAGE TRN  PER INDIV: HCPCS | Performed by: DIETITIAN, REGISTERED

## 2022-02-17 RX ORDER — METFORMIN HCL 500 MG
TABLET, EXTENDED RELEASE 24 HR ORAL
Qty: 360 TABLET | Refills: 1 | Status: SHIPPED | OUTPATIENT
Start: 2022-02-17 | End: 2022-03-18

## 2022-02-17 NOTE — Clinical Note
Blood sugars almost all in goal.  Diab Education is complete.  Pt has follow Diab Visit scheduled with you.

## 2022-02-17 NOTE — TELEPHONE ENCOUNTER
Dr Lozano,    Pt is tolerating the 4 extended release Metformin daily and would like to update his refill to 90 day supply.  Order pended for your review and signature.    Zainab Green RD [facility]  [phone]

## 2022-02-17 NOTE — PROGRESS NOTES
"Diabetes Self-Management Education & Support    Presents for: Follow-up    SUBJECTIVE/OBJECTIVE:  Presents for: Follow-up  Accompanied by: Spouse  Diabetes education in the past 24mo: Yes  Focus of Visit: Monitoring, Taking Medication, Diabetes Pathophysiology  Diabetes type: Type 2  Date of diagnosis: 2021  Disease course: Improving  How confident are you filling out medical forms by yourself:: Not Assessed  Diabetes management related comments/concerns: None  Difficulty affording diabetes medication?: Yes (question on amount of test strips can get at once.)  Difficulty affording diabetes testing supplies?: No  Other concerns:: None  Cultural Influences/Ethnic Background:  Not  or       Diabetes Symptoms & Complications:  Fatigue: Sometimes (works night shift and thinks this may be contributing.)  Neuropathy: Sometimes  Polydipsia: No  Polyphagia: No  Polyuria: No  Visual change: No (blurred vision for the past 3-4 weeks)  Slow healing wounds: Sometimes  Symptom course: Improving  Weight trend: Decreasing (lost approx 15#)  Complications assessed today?: Yes  Autonomic neuropathy: No  CVA: No  Heart disease: No  Nephropathy: No  Peripheral neuropathy: No  Peripheral Vascular Disease: No  Retinopathy: No  Sexual dysfunction: Other    Patient Problem List and Family Medical History reviewed for relevant medical history, current medical status, and diabetes risk factors.    Vitals:  There were no vitals taken for this visit.  Estimated body mass index is 41.91 kg/m  as calculated from the following:    Height as of 6/25/21: 1.803 m (5' 11\").    Weight as of 12/14/21: 136.3 kg (300 lb 8 oz).   Last 3 BP:   BP Readings from Last 3 Encounters:   12/14/21 132/88   06/25/21 126/88   12/11/20 130/82       History   Smoking Status     Never Smoker   Smokeless Tobacco     Former User       Labs:  Lab Results   Component Value Date    A1C 11.3 12/14/2021    A1C 6.2 07/09/2021     Lab Results   Component Value " Date     12/14/2021     06/25/2021     Lab Results   Component Value Date     12/14/2021     06/25/2021     HDL Cholesterol   Date Value Ref Range Status   06/25/2021 36 (L) >39 mg/dL Final     Direct Measure HDL   Date Value Ref Range Status   12/14/2021 32 (L) >=40 mg/dL Final   ]  GFR Estimate   Date Value Ref Range Status   12/14/2021 >90 >60 mL/min/1.73m2 Final     Comment:     As of July 11, 2021, eGFR is calculated by the CKD-EPI creatinine equation, without race adjustment. eGFR can be influenced by muscle mass, exercise, and diet. The reported eGFR is an estimation only and is only applicable if the renal function is stable.     No results found for: GFRESTBLACK  Lab Results   Component Value Date    CR 0.74 12/14/2021     No results found for: MICROALBUMIN    Healthy Eating:  Healthy Eating Assessed Today: Yes  Cultural/Orthodoxy diet restrictions?: No  Meal planning/habits: Carb counting, Smaller portions  How many times a week on average do you eat food made away from home (restaurant/take-out)?: 1  Meals include: Lunch, Dinner, Afternoon Snack, Evening Snack (works night shift)  Lunch: 2:00  (when gets up) chicken breasts, salads, cheese, vegetables or eggs and whole wheat bread; [place]  Dinner: 10-10:30p salads, chicken, pork chops  Snacks: 3 am turkey sticks  Other: 7 pm turkey sticks, carrots.  Beverages: Water, Sports drinks (Gatorade zero, every once in a while diet pop)  Has patient met with a dietitian in the past?: Yes    Being Active:  Being Active Assessed Today: Yes  Exercise:: Currently not exercising (stopped due to high BG and blurred vision.)  Days per week of moderate to strenuous exercise (like a brisk walk): 0  Barrier to exercise: Time (Pt driving wife to appointments right now due injured foot)    Monitoring:  Monitoring Assessed Today: Yes  Did patient bring glucose meter to appointment? : Yes  Blood Glucose Meter: Accu-chek  Times checking blood sugar  at home (number): 2  Times checking blood sugar at home (per): Day  Blood glucose trend: No change    Date Breakfast  Lunch  Dinner  Bedtime    Before After Before After Before After    2/17/22 122         2/16/22 146    94 111    2/15/22 139    104 133    2/14/22 141    119 114    2/13/22 121    92 119    2/12/22 124         2/11/22 110    91 96      Date Breakfast  Lunch  Dinner  Bedtime    Before After Before After Before After    2/10/22 122    99 108    2/9/22 125    103 115    2/8/22 127    104 106    2/7/22 149    104 123    2/6/22 120    72 78    2/5/22 140    102 127    2/4/22 109    96 114            Taking Medications:  Diabetes Medication(s)     Biguanides       metFORMIN (GLUCOPHAGE-XR) 500 MG 24 hr tablet    Take 2000 mg daily ( 4 tablets)          Taking Medication Assessed Today: Yes  Current Treatments: None, Oral Medication (taken by mouth)  Problems taking diabetes medications regularly?: No  Diabetes medication side effects?: No    Problem Solving:  Problem Solving Assessed Today: No  Is the patient at risk for hypoglycemia?: No              Reducing Risks:  Reducing Risks Assessed Today: Yes  Diabetes Risks: Age over 45 years  CAD Risks: Diabetes Mellitus, Dyslipidemia, Obesity, Sedentary lifestyle, Stress  Has dilated eye exam at least once a year?: No (has appointment scheduled)  Sees dentist every 6 months?: Yes  Feet checked by healthcare provider in the last year?: No    Healthy Coping:  Informal Support system:: Children, Spouse  Stage of change: ACTION (Actively working towards change)  Patient Activation Measure Survey Score:  No flowsheet data found.    Diabetes knowledge and skills assessment:   Patient is knowledgeable in diabetes management concepts related to: Healthy Eating and Monitoring    Patient needs further education on the following diabetes management concepts: Healthy Eating, Monitoring and Taking Medication    Based on learning assessment above, most appropriate setting  for further diabetes education would be: Individual setting.      INTERVENTIONS:    Education provided today on:  AADE Self-Care Behaviors:  Diabetes Pathophysiology  Healthy Eating: carbohydrate counting, consistency in amount, composition, and timing of food intake, weight reduction, portion control and label reading  Being Active: relationship to blood glucose and describe appropriate activity program  Monitoring: log and interpret results, individual blood glucose targets and frequency of monitoring  Taking Medication: action of prescribed medication and when to take medications  Reducing Risks: major complications of diabetes, prevention, early diagnostic measures and treatment of complications, foot care, appropriate dental care, annual eye exam, A1C - goals, relating to blood glucose levels, how often to check, lipids levels and goals and blood pressure and goals    Opportunities for ongoing education and support in diabetes-self management were discussed.    Pt verbalized understanding of concepts discussed and recommendations provided today.       Education Materials Provided:  [employer] Healthy Living with Diabetes Book, nutrition label, myplate      ASSESSMENT:  Pt doing well.  Blood sugars slightly increased when he had COVID, but now almost 100% in target.  Education is complete.  Pt encouraged to follow up annually and already has DM visit with PCP scheduled.        Patient's most recent   Lab Results   Component Value Date    A1C 11.3 12/14/2021    A1C 6.2 07/09/2021    is not meeting goal of <7.0    PLAN  See Patient Instructions for co-developed, patient-stated behavior change goals.  AVS printed and provided to patient today. See Follow-Up section for recommended follow-up.      Time Spent: 60 minutes  Encounter Type: Individual    Any diabetes medication dose changes were made via the CDE Protocol and Collaborative Practice Agreement with the patient's referring provider. A copy of this  encounter was shared with the provider.    Zainab Green RD [facility]– [place]  [phone]

## 2022-02-17 NOTE — LETTER
"    2/17/2022         RE: John Price  [address] [place] [id number]        Dear Colleague,    Thank you for referring your patient, John Price, to the [facility]. Please see a copy of my visit note below.    Diabetes Self-Management Education & Support    Presents for: Follow-up    SUBJECTIVE/OBJECTIVE:  Presents for: Follow-up  Accompanied by: Spouse  Diabetes education in the past 24mo: Yes  Focus of Visit: Monitoring, Taking Medication, Diabetes Pathophysiology  Diabetes type: Type 2  Date of diagnosis: 2021  Disease course: Improving  How confident are you filling out medical forms by yourself:: Not Assessed  Diabetes management related comments/concerns: None  Difficulty affording diabetes medication?: Yes (question on amount of test strips can get at once.)  Difficulty affording diabetes testing supplies?: No  Other concerns:: None  Cultural Influences/Ethnic Background:  Not  or       Diabetes Symptoms & Complications:  Fatigue: Sometimes (works night shift and thinks this may be contributing.)  Neuropathy: Sometimes  Polydipsia: No  Polyphagia: No  Polyuria: No  Visual change: No (blurred vision for the past 3-4 weeks)  Slow healing wounds: Sometimes  Symptom course: Improving  Weight trend: Decreasing (lost approx 15#)  Complications assessed today?: Yes  Autonomic neuropathy: No  CVA: No  Heart disease: No  Nephropathy: No  Peripheral neuropathy: No  Peripheral Vascular Disease: No  Retinopathy: No  Sexual dysfunction: Other    Patient Problem List and Family Medical History reviewed for relevant medical history, current medical status, and diabetes risk factors.    Vitals:  There were no vitals taken for this visit.  Estimated body mass index is 41.91 kg/m  as calculated from the following:    Height as of 6/25/21: 1.803 m (5' 11\").    Weight as of 12/14/21: 136.3 kg (300 lb 8 oz).   Last 3 BP:   BP Readings from Last 3 Encounters:   12/14/21 " 132/88   06/25/21 126/88   12/11/20 130/82       History   Smoking Status     Never Smoker   Smokeless Tobacco     Former User       Labs:  Lab Results   Component Value Date    A1C 11.3 12/14/2021    A1C 6.2 07/09/2021     Lab Results   Component Value Date     12/14/2021     06/25/2021     Lab Results   Component Value Date     12/14/2021     06/25/2021     HDL Cholesterol   Date Value Ref Range Status   06/25/2021 36 (L) >39 mg/dL Final     Direct Measure HDL   Date Value Ref Range Status   12/14/2021 32 (L) >=40 mg/dL Final   ]  GFR Estimate   Date Value Ref Range Status   12/14/2021 >90 >60 mL/min/1.73m2 Final     Comment:     As of July 11, 2021, eGFR is calculated by the CKD-EPI creatinine equation, without race adjustment. eGFR can be influenced by muscle mass, exercise, and diet. The reported eGFR is an estimation only and is only applicable if the renal function is stable.     No results found for: GFRESTBLACK  Lab Results   Component Value Date    CR 0.74 12/14/2021     No results found for: MICROALBUMIN    Healthy Eating:  Healthy Eating Assessed Today: Yes  Cultural/Mandaeism diet restrictions?: No  Meal planning/habits: Carb counting, Smaller portions  How many times a week on average do you eat food made away from home (restaurant/take-out)?: 1  Meals include: Lunch, Dinner, Afternoon Snack, Evening Snack (works night shift)  Lunch: 2:00  (when gets up) chicken breasts, salads, cheese, vegetables or eggs and whole wheat bread; [place]  Dinner: 10-10:30p salads, chicken, pork chops  Snacks: 3 am turkey sticks  Other: 7 pm turkey sticks, carrots.  Beverages: Water, Sports drinks (Gatorade zero, every once in a while diet pop)  Has patient met with a dietitian in the past?: Yes    Being Active:  Being Active Assessed Today: Yes  Exercise:: Currently not exercising (stopped due to high BG and blurred vision.)  Days per week of moderate to strenuous exercise (like a brisk  walk): 0  Barrier to exercise: Time (Pt driving wife to appointments right now due injured foot)    Monitoring:  Monitoring Assessed Today: Yes  Did patient bring glucose meter to appointment? : Yes  Blood Glucose Meter: Accu-chek  Times checking blood sugar at home (number): 2  Times checking blood sugar at home (per): Day  Blood glucose trend: No change    Date Breakfast  Lunch  Dinner  Bedtime    Before After Before After Before After    2/17/22 122         2/16/22 146    94 111    2/15/22 139    104 133    2/14/22 141    119 114    2/13/22 121    92 119    2/12/22 124         2/11/22 110    91 96      Date Breakfast  Lunch  Dinner  Bedtime    Before After Before After Before After    2/10/22 122    99 108    2/9/22 125    103 115    2/8/22 127    104 106    2/7/22 149    104 123    2/6/22 120    72 78    2/5/22 140    102 127    2/4/22 109    96 114            Taking Medications:  Diabetes Medication(s)     Biguanides       metFORMIN (GLUCOPHAGE-XR) 500 MG 24 hr tablet    Take 2000 mg daily ( 4 tablets)          Taking Medication Assessed Today: Yes  Current Treatments: None, Oral Medication (taken by mouth)  Problems taking diabetes medications regularly?: No  Diabetes medication side effects?: No    Problem Solving:  Problem Solving Assessed Today: No  Is the patient at risk for hypoglycemia?: No              Reducing Risks:  Reducing Risks Assessed Today: Yes  Diabetes Risks: Age over 45 years  CAD Risks: Diabetes Mellitus, Dyslipidemia, Obesity, Sedentary lifestyle, Stress  Has dilated eye exam at least once a year?: No (has appointment scheduled)  Sees dentist every 6 months?: Yes  Feet checked by healthcare provider in the last year?: No    Healthy Coping:  Informal Support system:: Children, Spouse  Stage of change: ACTION (Actively working towards change)  Patient Activation Measure Survey Score:  No flowsheet data found.    Diabetes knowledge and skills assessment:   Patient is knowledgeable in diabetes  management concepts related to: Healthy Eating and Monitoring    Patient needs further education on the following diabetes management concepts: Healthy Eating, Monitoring and Taking Medication    Based on learning assessment above, most appropriate setting for further diabetes education would be: Individual setting.      INTERVENTIONS:    Education provided today on:  AADE Self-Care Behaviors:  Diabetes Pathophysiology  Healthy Eating: carbohydrate counting, consistency in amount, composition, and timing of food intake, weight reduction, portion control and label reading  Being Active: relationship to blood glucose and describe appropriate activity program  Monitoring: log and interpret results, individual blood glucose targets and frequency of monitoring  Taking Medication: action of prescribed medication and when to take medications  Reducing Risks: major complications of diabetes, prevention, early diagnostic measures and treatment of complications, foot care, appropriate dental care, annual eye exam, A1C - goals, relating to blood glucose levels, how often to check, lipids levels and goals and blood pressure and goals    Opportunities for ongoing education and support in diabetes-self management were discussed.    Pt verbalized understanding of concepts discussed and recommendations provided today.       Education Materials Provided:  [employer] Healthy Living with Diabetes Book, nutrition label, myplate      ASSESSMENT:  Pt doing well.  Blood sugars slightly increased when he had COVID, but now almost 100% in target.  Education is complete.  Pt encouraged to follow up annually and already has DM visit with PCP scheduled.        Patient's most recent   Lab Results   Component Value Date    A1C 11.3 12/14/2021    A1C 6.2 07/09/2021    is not meeting goal of <7.0    PLAN  See Patient Instructions for co-developed, patient-stated behavior change goals.  AVS printed and provided to patient today. See  Follow-Up section for recommended follow-up.      Time Spent: 60 minutes  Encounter Type: Individual    Any diabetes medication dose changes were made via the CDE Protocol and Collaborative Practice Agreement with the patient's referring provider. A copy of this encounter was shared with the provider.    Zainab Green RD [facility]  [phone]

## 2022-03-18 ENCOUNTER — OFFICE VISIT (OUTPATIENT)
Dept: FAMILY MEDICINE | Facility: CLINIC | Age: 52
End: 2022-03-18
Payer: COMMERCIAL

## 2022-03-18 VITALS
SYSTOLIC BLOOD PRESSURE: 122 MMHG | TEMPERATURE: 97 F | DIASTOLIC BLOOD PRESSURE: 88 MMHG | HEART RATE: 88 BPM | OXYGEN SATURATION: 98 % | WEIGHT: 289 LBS | BODY MASS INDEX: 40.31 KG/M2 | RESPIRATION RATE: 16 BRPM

## 2022-03-18 DIAGNOSIS — E66.01 MORBID OBESITY (H): ICD-10-CM

## 2022-03-18 DIAGNOSIS — E11.65 TYPE 2 DIABETES MELLITUS WITH HYPERGLYCEMIA, WITHOUT LONG-TERM CURRENT USE OF INSULIN (H): Primary | ICD-10-CM

## 2022-03-18 DIAGNOSIS — E78.2 MIXED HYPERLIPIDEMIA: ICD-10-CM

## 2022-03-18 DIAGNOSIS — J31.0 CHRONIC RHINITIS: ICD-10-CM

## 2022-03-18 LAB
CHOLEST SERPL-MCNC: 132 MG/DL
CREAT UR-MCNC: 126 MG/DL
FASTING STATUS PATIENT QL REPORTED: YES
HBA1C MFR BLD: 6.4 % (ref 0–5.6)
HDLC SERPL-MCNC: 37 MG/DL
LDLC SERPL CALC-MCNC: 79 MG/DL
MICROALBUMIN UR-MCNC: 13 MG/L
MICROALBUMIN/CREAT UR: 10.32 MG/G CR (ref 0–17)
NONHDLC SERPL-MCNC: 95 MG/DL
TRIGL SERPL-MCNC: 82 MG/DL

## 2022-03-18 PROCEDURE — 82043 UR ALBUMIN QUANTITATIVE: CPT | Performed by: FAMILY MEDICINE

## 2022-03-18 PROCEDURE — 36415 COLL VENOUS BLD VENIPUNCTURE: CPT | Performed by: FAMILY MEDICINE

## 2022-03-18 PROCEDURE — 83036 HEMOGLOBIN GLYCOSYLATED A1C: CPT | Performed by: FAMILY MEDICINE

## 2022-03-18 PROCEDURE — 80061 LIPID PANEL: CPT | Performed by: FAMILY MEDICINE

## 2022-03-18 PROCEDURE — 99214 OFFICE O/P EST MOD 30 MIN: CPT | Performed by: FAMILY MEDICINE

## 2022-03-18 PROCEDURE — 99207 PR FOOT EXAM NO CHARGE: CPT | Performed by: FAMILY MEDICINE

## 2022-03-18 RX ORDER — ATORVASTATIN CALCIUM 40 MG/1
40 TABLET, FILM COATED ORAL DAILY
Qty: 90 TABLET | Refills: 4 | Status: SHIPPED | OUTPATIENT
Start: 2022-03-18 | End: 2023-01-20

## 2022-03-18 RX ORDER — MONTELUKAST SODIUM 10 MG/1
10 TABLET ORAL AT BEDTIME
Qty: 90 TABLET | Refills: 4 | Status: SHIPPED | OUTPATIENT
Start: 2022-03-18 | End: 2023-01-20

## 2022-03-18 RX ORDER — METFORMIN HCL 500 MG
2000 TABLET, EXTENDED RELEASE 24 HR ORAL
Qty: 360 TABLET | Refills: 1 | Status: SHIPPED | OUTPATIENT
Start: 2022-03-18 | End: 2022-10-13

## 2022-03-18 ASSESSMENT — PAIN SCALES - GENERAL: PAINLEVEL: NO PAIN (0)

## 2022-03-18 NOTE — PROGRESS NOTES
"   Assessment & Plan     ASSESSMENT/ORDERS:    ICD-10-CM    1. Type 2 diabetes mellitus with hyperglycemia, without long-term current use of insulin (H)  E11.65 Albumin Random Urine Quantitative with Creat Ratio     HEMOGLOBIN A1C     Lipid panel reflex to direct LDL Fasting     FOOT EXAM     metFORMIN (GLUCOPHAGE-XR) 500 MG 24 hr tablet     Lipid panel reflex to direct LDL Fasting     HEMOGLOBIN A1C     Albumin Random Urine Quantitative with Creat Ratio   2. Mixed hyperlipidemia  E78.2 atorvastatin (LIPITOR) 40 MG tablet   3. Chronic rhinitis  J31.0 montelukast (SINGULAIR) 10 MG tablet   4. Morbid obesity (H)  E66.01      PLAN:  1.  Labs as noted above.  Medications refilled.  Will see what his hemoglobin A1c and lipid is today.  He is tolerating treatment well and feels better.  He is working on weight loss for obesity.  If LDL is >70, would consider dose increase.             BMI:   Estimated body mass index is 40.31 kg/m  as calculated from the following:    Height as of 6/25/21: 1.803 m (5' 11\").    Weight as of this encounter: 131.1 kg (289 lb).           Return in about 3 months (around 6/18/2022) for diabetes recheck, okay for telephone, video, or office visit.    Timothy Lozano MD  [facility] DIGNA Lopez is a 51 year old who presents for the following health issues     History of Present Illness       Diabetes:   He presents for follow up of diabetes.  He is checking home blood glucose one time daily. He checks blood glucose before and after meals.  Blood glucose is never over 200 and never under 70. When his blood glucose is low, the patient is asymptomatic for confusion, blurred vision, lethargy and reports not feeling dizzy, shaky, or weak.  He has no concerns regarding his diabetes at this time.  He is not experiencing numbness or burning in feet, excessive thirst, blurry vision, weight changes or redness, sores or blisters on feet. The patient has not had a " diabetic eye exam in the last 12 months.         He eats 2-3 servings of fruits and vegetables daily.He consumes 1 sweetened beverage(s) daily.He exercises with enough effort to increase his heart rate 10 to 19 minutes per day.  He exercises with enough effort to increase his heart rate 3 or less days per week.   He is taking medications regularly.           Feeling good.  Tolerating metformin and Lipitor (atorvastatin).  Has eye exam appointment scheduled.  Is working on weight loss.    Review of Systems         Objective    /88   Pulse 88   Temp 97  F (36.1  C) (Temporal)   Resp 16   Wt 131.1 kg (289 lb)   SpO2 98%   BMI 40.31 kg/m    Body mass index is 40.31 kg/m .  Physical Exam  Constitutional:       Appearance: He is well-developed. He is morbidly obese.   Cardiovascular:      Rate and Rhythm: Normal rate and regular rhythm.      Heart sounds: Normal heart sounds, S1 normal and S2 normal. No murmur heard.  Pulmonary:      Effort: Pulmonary effort is normal. No respiratory distress.      Breath sounds: Normal breath sounds. No wheezing, rhonchi or rales.   Musculoskeletal:      Comments: FEET:  monofilament exam normal bilaterally.  Good hair growth.  Dorsalis pedis pulses 2+ bilaterally.  Feet warm.    Feet:      Right foot:      Skin integrity: No ulcer, blister, skin breakdown or erythema.      Left foot:      Skin integrity: No ulcer, blister, skin breakdown or erythema.   Neurological:      Mental Status: He is alert.

## 2022-03-21 NOTE — RESULT ENCOUNTER NOTE
John,  Your results of your hemoglobin A1c are fantastic.  Your cholesterol results also look good.    Please let me know if you have any questions.    Sincerely,  Dr. Lozano

## 2022-07-16 ENCOUNTER — HEALTH MAINTENANCE LETTER (OUTPATIENT)
Age: 52
End: 2022-07-16

## 2022-09-18 ENCOUNTER — HEALTH MAINTENANCE LETTER (OUTPATIENT)
Age: 52
End: 2022-09-18

## 2022-10-10 ENCOUNTER — MYC MEDICAL ADVICE (OUTPATIENT)
Dept: FAMILY MEDICINE | Facility: CLINIC | Age: 52
End: 2022-10-10

## 2022-10-12 NOTE — TELEPHONE ENCOUNTER
This would be fine to discuss.  telephone, video, or office-based visit needed.    Will have staff notify patient to schedule    Timothy Lozano MD

## 2022-10-13 ENCOUNTER — VIRTUAL VISIT (OUTPATIENT)
Dept: FAMILY MEDICINE | Facility: CLINIC | Age: 52
End: 2022-10-13
Payer: COMMERCIAL

## 2022-10-13 DIAGNOSIS — E11.9 TYPE 2 DIABETES MELLITUS WITHOUT COMPLICATION, WITHOUT LONG-TERM CURRENT USE OF INSULIN (H): Primary | ICD-10-CM

## 2022-10-13 DIAGNOSIS — N52.2 DRUG-INDUCED ERECTILE DYSFUNCTION: ICD-10-CM

## 2022-10-13 DIAGNOSIS — F41.1 GAD (GENERALIZED ANXIETY DISORDER): ICD-10-CM

## 2022-10-13 DIAGNOSIS — E66.01 MORBID OBESITY (H): ICD-10-CM

## 2022-10-13 PROCEDURE — 99214 OFFICE O/P EST MOD 30 MIN: CPT | Mod: 95 | Performed by: FAMILY MEDICINE

## 2022-10-13 RX ORDER — SILDENAFIL 100 MG/1
TABLET, FILM COATED ORAL
Qty: 10 TABLET | Refills: 5 | Status: SHIPPED | OUTPATIENT
Start: 2022-10-13 | End: 2023-04-14

## 2022-10-13 RX ORDER — TIRZEPATIDE 2.5 MG/.5ML
2.5 INJECTION, SOLUTION SUBCUTANEOUS
Qty: 2 ML | Refills: 0 | Status: SHIPPED | OUTPATIENT
Start: 2022-10-13 | End: 2022-11-14

## 2022-10-13 RX ORDER — VENLAFAXINE HYDROCHLORIDE 150 MG/1
150 CAPSULE, EXTENDED RELEASE ORAL DAILY
Qty: 90 CAPSULE | Refills: 4 | Status: SHIPPED | OUTPATIENT
Start: 2022-10-13 | End: 2023-01-20

## 2022-10-13 RX ORDER — TIRZEPATIDE 5 MG/.5ML
5 INJECTION, SOLUTION SUBCUTANEOUS
Qty: 2 ML | Refills: 0 | Status: SHIPPED | OUTPATIENT
Start: 2022-11-10 | End: 2022-11-14

## 2022-10-13 RX ORDER — METFORMIN HCL 500 MG
2000 TABLET, EXTENDED RELEASE 24 HR ORAL
Qty: 360 TABLET | Refills: 1 | Status: SHIPPED | OUTPATIENT
Start: 2022-10-13 | End: 2023-01-20

## 2022-10-13 NOTE — PROGRESS NOTES
John is a 52 year old who is being evaluated via a billable telephone visit.      What phone number would you like to be contacted at? [phone]  How would you like to obtain your AVS? MyChart    Assessment & Plan     ASSESSMENT/ORDERS:    ICD-10-CM    1. Type 2 diabetes mellitus without complication, without long-term current use of insulin (H)  E11.9 metFORMIN (GLUCOPHAGE XR) 500 MG 24 hr tablet     Hemoglobin A1c     Tirzepatide (MOUNJARO) 2.5 MG/0.5ML SOPN     Tirzepatide (MOUNJARO) 5 MG/0.5ML SOPN     aspirin (ASA) 81 MG EC tablet     Tirzepatide 7.5 MG/0.5ML SOPN     Tirzepatide 10 MG/0.5ML SOPN     Tirzepatide 12.5 MG/0.5ML SOPN     Tirzepatide 15 MG/0.5ML SOPN      2. Morbid obesity (H)  E66.01 Tirzepatide (MOUNJARO) 2.5 MG/0.5ML SOPN     Tirzepatide (MOUNJARO) 5 MG/0.5ML SOPN     Tirzepatide 7.5 MG/0.5ML SOPN     Tirzepatide 10 MG/0.5ML SOPN     Tirzepatide 12.5 MG/0.5ML SOPN     Tirzepatide 15 MG/0.5ML SOPN      3. GUS (generalized anxiety disorder)  F41.1 venlafaxine (EFFEXOR XR) 150 MG 24 hr capsule      4. Drug-induced erectile dysfunction  N52.2 sildenafil (VIAGRA) 100 MG tablet        PLAN:  1.  Started on GLP-1 agonist for diabetes and weight management.  Dose titration as noted above.  Hemoglobin A1c recheck  2.  Recommended starting aspirin.  3.   Medications refilled for all other above noted stable conditions.  Labs ordered as noted above.                  No follow-ups on file.    Timothy Lozano MD  [facility]   John is a 52 year old, presenting for the following health issues:  Weight Loss      History of Present Illness       Reason for visit:  Weight loss shot    He eats 0-1 servings of fruits and vegetables daily.He consumes 1 sweetened beverage(s) daily.He exercises with enough effort to increase his heart rate 9 or less minutes per day.  He exercises with enough effort to increase his heart rate 3 or less days per week.   He is taking  medications regularly.           Wife started GLP-1 agonist for weight loss.  He found out this was a diabetes medication and is interested in this both for diabetes management as well as his own obesity issues.  He is working on diabetes management and has had improvement on hemoglobin A1c but wants better control over appetite issues.  Was told about a specific GLP-1 agonist that he wants to try.    Is not taking aspirin currently.    Anxiety well managed on venlafaxine.  Viagra working for erectile dysfunction.    Review of Systems         Objective           Vitals:  No vitals were obtained today due to virtual visit.    Physical Exam   healthy, alert and no distress  PSYCH: Alert and oriented times 3; coherent speech, normal   rate and volume, able to articulate logical thoughts, able   to abstract reason, no tangential thoughts, no hallucinations   or delusions  His affect is normal  RESP: No cough, no audible wheezing, able to talk in full sentences  Remainder of exam unable to be completed due to telephone visits                Phone call duration: 22 minutes

## 2022-10-17 ENCOUNTER — TELEPHONE (OUTPATIENT)
Dept: FAMILY MEDICINE | Facility: CLINIC | Age: 52
End: 2022-10-17

## 2022-10-17 NOTE — TELEPHONE ENCOUNTER
Prior Authorization Retail Medication Request    Medication/Dose: Tirzepatide (MOUNJARO) 2.5 MG/0.5ML SOPN, Tirzepatide (MOUNJARO) 5 MG/0.5ML SOPN, Tirzepatide 7.5 MG/0.5ML SOPN, Tirzepatide 10 MG/0.5ML SOPN, Tirzepatide 12.5 MG/0.5ML SOPN, Tirzepatide 15 MG/0.5ML SOPN    ICD code (if different than what is on RX):  Type 2 diabetes mellitus without complication, without long-term current use of insulin (H) [E11.9]  - Primary,Morbid obesity (H) [E66.01]     Previously Tried and Failed:   Rationale:  Patient has gradual increase of dosing every 28 days.    Insurance Name:  [facility]  Insurance ID:[id number]      Pharmacy Information (if different than what is on RX)  Name: [facility]  Phone:  [phone]

## 2022-10-18 ENCOUNTER — TELEPHONE (OUTPATIENT)
Dept: FAMILY MEDICINE | Facility: CLINIC | Age: 52
End: 2022-10-18

## 2022-10-18 NOTE — TELEPHONE ENCOUNTER
PA Initiation    Medication: Tirzepatide (MOUNJARO) 15 MG/0.5ML SOPN   Insurance Company: [employer] - Phone [phone] Fax [phone]  [facility] Filling the Rx: [facility] [place] - [address]  Filling Pharmacy Phone: [phone]  Filling Pharmacy Fax: [phone]  Start Date: 10/18/2022

## 2022-10-18 NOTE — TELEPHONE ENCOUNTER
PA Initiation    Medication: Tirzepatide (MOUNJARO) 2.5 MG/0.5ML SOPN   Insurance Company: [employer] - Phone [phone] Fax [phone]  Ph[facility] Filling the Rx: [facility] [place] - [address]  Filling Pharmacy Phone: [phone]  Filling Pharmacy Fax: [phone]  Start Date: 10/18/2022

## 2022-10-18 NOTE — TELEPHONE ENCOUNTER
PA Initiation    Medication: Tirzepatide (MOUNJARO) 5 MG/0.5ML SOPN   Insurance Company: [employer] - Phone [phone] Fax [phone]  Ph[facility]illing the Rx[facility] [place] - [address]  Filling Pharmacy Phone: [phone]  Filling Pharmacy Fax: [phone]  Start Date: 10/18/2022

## 2022-10-18 NOTE — TELEPHONE ENCOUNTER
PA Initiation    Medication: Tirzepatide (MOUNJARO) 12.5 MG/0.5ML SOPN   Insurance Company: [employer] - Phone [phone] Fax [phone]  Ph[facility] Filling the Rx[facility] [place] - [address]  Filling Pharmacy Phone: [phone]  Filling Pharmacy Fax: [phone]  Start Date: 10/18/2022

## 2022-10-18 NOTE — TELEPHONE ENCOUNTER
PA Initiation    Medication: Tirzepatide (MOUNJARO) 10 MG/0.5ML SOPN   Insurance Company: [employer] - Phone [phone] Fax [phone]  [facility] Filling the Rx[facility] [place] - [address]  Filling Pharmacy Phone: [phone]  Filling Pharmacy Fax: [phone]  Start Date: 10/18/2022

## 2022-10-18 NOTE — TELEPHONE ENCOUNTER
PA Initiation    Medication: Tirzepatide (MOUNJARO) 7.5 MG/0.5ML SOPN   Insurance Company: [employer] - Phone [phone] Fax [phone]  Ph[facility] Filling the Rx: [facility] [place] - [address]  Filling Pharmacy Phone: [phone]  Filling Pharmacy Fax: [phone]  Start Date: 10/18/2022

## 2022-10-19 NOTE — TELEPHONE ENCOUNTER
PRIOR AUTHORIZATION DENIED    Medication: Tirzepatide (MOUNJARO) 7.5 MG/0.5ML SOPN--DENIED    Denial Date: 10/18/2022    Denial Rational: Patient needs to try and fail ALL preferred medications which include Bydureon, Ozempic, Rybelsus, Trulicity, Victoza and Byetta.     Appeal Information:

## 2022-10-19 NOTE — TELEPHONE ENCOUNTER
PRIOR AUTHORIZATION DENIED    Medication: Tirzepatide (MOUNJARO) 12.5 MG/0.5ML SOPN--DENIED    Denial Date: 10/18/2022    Denial Rational: Patient needs to try and fail ALL preferred medications which include Bydureon, Ozempic, Rybelsus, Trulicity, Victoza and Byetta.     Appeal Information:

## 2022-10-19 NOTE — TELEPHONE ENCOUNTER
PRIOR AUTHORIZATION DENIED    Medication: Tirzepatide (MOUNJARO) 2.5 MG/0.5ML SOPN--DENIED    Denial Date: 10/18/2022    Denial Rational: Patient needs to try and fail ALL preferred medications which include Bydureon, Ozempic, Rybelsus, Trulicity, Victoza and Byetta.     Appeal Information:

## 2022-10-19 NOTE — TELEPHONE ENCOUNTER
PRIOR AUTHORIZATION DENIED    Medication: Tirzepatide (MOUNJARO) 15 MG/0.5ML SOPN--DENIED    Denial Date: 10/18/2022    Denial Rational: Patient needs to try and fail ALL preferred medications which include Bydureon, Ozempic, Rybelsus, Trulicity, Victoza and Byetta.     Appeal Information:

## 2022-10-19 NOTE — TELEPHONE ENCOUNTER
PRIOR AUTHORIZATION DENIED    Medication: Tirzepatide (MOUNJARO) 10 MG/0.5ML SOPN--DENIED    Denial Date: 10/18/2022    Denial Rational: Patient needs to try and fail ALL preferred medications which include Bydureon, Ozempic, Rybelsus, Trulicity, Victoza and Byetta.     Appeal Information:

## 2022-10-19 NOTE — TELEPHONE ENCOUNTER
PRIOR AUTHORIZATION DENIED    Medication: Tirzepatide (MOUNJARO) 5 MG/0.5ML SOPN--DENIED    Denial Date: 10/18/2022    Denial Rational: Patient needs to try and fail ALL preferred medications which include Bydureon, Ozempic, Rybelsus, Trulicity, Victoza and Byetta.     Appeal Information:

## 2022-10-25 NOTE — TELEPHONE ENCOUNTER
Will have staff notify patient that medication is not covered by his insurance.  They state:    Denial Rational: Patient needs to try and fail ALL preferred medications which include Bydureon, Ozempic, Rybelsus, Trulicity, Victoza and Byetta.    I would recommend Ozempic (semaglutide) as this is similar to Tirzepatide and he should experience similar results.    If he is willing to do this instead, I will order alternative.    Timothy Lozano MD

## 2022-10-27 NOTE — TELEPHONE ENCOUNTER
Pt called and relayed info. He was able to get the 25.00 monthly coupon for monmoralesaro and will stick with this until the coupon runs out.     Also scheduled lab for pt.

## 2022-10-28 ENCOUNTER — LAB (OUTPATIENT)
Dept: LAB | Facility: CLINIC | Age: 52
End: 2022-10-28
Payer: COMMERCIAL

## 2022-10-28 DIAGNOSIS — E11.9 TYPE 2 DIABETES MELLITUS WITHOUT COMPLICATION, WITHOUT LONG-TERM CURRENT USE OF INSULIN (H): ICD-10-CM

## 2022-10-28 LAB — HBA1C MFR BLD: 7.1 % (ref 0–5.6)

## 2022-10-28 PROCEDURE — 83036 HEMOGLOBIN GLYCOSYLATED A1C: CPT

## 2022-10-28 PROCEDURE — 36415 COLL VENOUS BLD VENIPUNCTURE: CPT

## 2022-10-31 NOTE — RESULT ENCOUNTER NOTE
John,  Your results show your hemoglobin A1c is still looking good.  Please let me know if you have any questions.    Sincerely,  Dr. Lozano

## 2022-11-09 DIAGNOSIS — E66.01 MORBID OBESITY (H): ICD-10-CM

## 2022-11-09 DIAGNOSIS — E11.9 TYPE 2 DIABETES MELLITUS WITHOUT COMPLICATION, WITHOUT LONG-TERM CURRENT USE OF INSULIN (H): ICD-10-CM

## 2022-11-11 NOTE — TELEPHONE ENCOUNTER
Routing refill request to provider for review/approval because:  Drug not on the [facility] refill protocol       Ann Banks RN, BSN     Opt out

## 2022-11-14 RX ORDER — TIRZEPATIDE 5 MG/.5ML
5 INJECTION, SOLUTION SUBCUTANEOUS
Qty: 2 ML | Refills: 0 | Status: SHIPPED | OUTPATIENT
Start: 2022-11-14 | End: 2023-01-02 | Stop reason: DRUGHIGH

## 2022-11-14 RX ORDER — TIRZEPATIDE 2.5 MG/.5ML
INJECTION, SOLUTION SUBCUTANEOUS
Qty: 2 ML | Refills: 0 | OUTPATIENT
Start: 2022-11-14

## 2022-11-17 NOTE — TELEPHONE ENCOUNTER
Patient has $25 pharmacy copay coupon.  Does anything further need to be done, or do you just need to contact pharmacy?    Timothy Lozano MD

## 2022-12-31 ENCOUNTER — MYC MEDICAL ADVICE (OUTPATIENT)
Dept: FAMILY MEDICINE | Facility: CLINIC | Age: 52
End: 2022-12-31

## 2022-12-31 DIAGNOSIS — E66.01 MORBID OBESITY (H): ICD-10-CM

## 2022-12-31 DIAGNOSIS — E11.9 TYPE 2 DIABETES MELLITUS WITHOUT COMPLICATION, WITHOUT LONG-TERM CURRENT USE OF INSULIN (H): ICD-10-CM

## 2023-01-20 ENCOUNTER — MYC MEDICAL ADVICE (OUTPATIENT)
Dept: FAMILY MEDICINE | Facility: CLINIC | Age: 53
End: 2023-01-20
Payer: COMMERCIAL

## 2023-01-20 DIAGNOSIS — E11.9 TYPE 2 DIABETES MELLITUS WITHOUT COMPLICATION, WITHOUT LONG-TERM CURRENT USE OF INSULIN (H): ICD-10-CM

## 2023-01-20 DIAGNOSIS — F41.1 GAD (GENERALIZED ANXIETY DISORDER): ICD-10-CM

## 2023-01-20 DIAGNOSIS — J31.0 CHRONIC RHINITIS: ICD-10-CM

## 2023-01-20 DIAGNOSIS — E78.2 MIXED HYPERLIPIDEMIA: ICD-10-CM

## 2023-01-24 RX ORDER — METFORMIN HCL 500 MG
2000 TABLET, EXTENDED RELEASE 24 HR ORAL
Qty: 360 TABLET | Refills: 0 | Status: SHIPPED | OUTPATIENT
Start: 2023-01-24 | End: 2023-04-14

## 2023-01-24 RX ORDER — VENLAFAXINE HYDROCHLORIDE 150 MG/1
150 CAPSULE, EXTENDED RELEASE ORAL DAILY
Qty: 90 CAPSULE | Refills: 1 | Status: SHIPPED | OUTPATIENT
Start: 2023-01-24 | End: 2023-04-14

## 2023-01-24 RX ORDER — MONTELUKAST SODIUM 10 MG/1
10 TABLET ORAL AT BEDTIME
Qty: 90 TABLET | Refills: 3 | Status: SHIPPED | OUTPATIENT
Start: 2023-01-24 | End: 2023-04-06

## 2023-01-24 RX ORDER — ATORVASTATIN CALCIUM 40 MG/1
40 TABLET, FILM COATED ORAL DAILY
Qty: 90 TABLET | Refills: 2 | Status: SHIPPED | OUTPATIENT
Start: 2023-01-24 | End: 2023-04-14

## 2023-01-24 NOTE — TELEPHONE ENCOUNTER
Patient is switching pharmacies- refills sent to pharmacy.  Oneyda Monsalve RN on 1/24/2023 at 9:22 AM

## 2023-02-02 ENCOUNTER — MYC MEDICAL ADVICE (OUTPATIENT)
Dept: FAMILY MEDICINE | Facility: CLINIC | Age: 53
End: 2023-02-02
Payer: COMMERCIAL

## 2023-02-05 DIAGNOSIS — E11.9 TYPE 2 DIABETES MELLITUS WITHOUT COMPLICATION, WITHOUT LONG-TERM CURRENT USE OF INSULIN (H): ICD-10-CM

## 2023-02-05 DIAGNOSIS — E66.01 MORBID OBESITY (H): ICD-10-CM

## 2023-02-07 RX ORDER — TIRZEPATIDE 10 MG/.5ML
INJECTION, SOLUTION SUBCUTANEOUS
Qty: 2 ML | Refills: 0 | OUTPATIENT
Start: 2023-02-07

## 2023-02-07 NOTE — TELEPHONE ENCOUNTER
Routing refill request to provider for review/approval because:    Requested Prescriptions   Pending Prescriptions Disp Refills    MOUNJARO 10 MG/0.5ML pen [Pharmacy Med Name: MOUNJARO 10MG/0.5ML SOPN] 2 mL 0     Sig: INJECT 10MG SUBCUTANEOUSLY EVERY 7 DAYS       There is no refill protocol information for this order

## 2023-02-07 NOTE — TELEPHONE ENCOUNTER
Called patient and talked to wife, advised to turn off autorefill feature at pharmacy as they keep getting old dosing called in.    Tiomthy Lozano MD

## 2023-03-05 DIAGNOSIS — E11.9 TYPE 2 DIABETES MELLITUS WITHOUT COMPLICATION, WITHOUT LONG-TERM CURRENT USE OF INSULIN (H): ICD-10-CM

## 2023-03-05 DIAGNOSIS — E66.01 MORBID OBESITY (H): ICD-10-CM

## 2023-03-05 RX ORDER — TIRZEPATIDE 12.5 MG/.5ML
INJECTION, SOLUTION SUBCUTANEOUS
Qty: 2 ML | Refills: 0 | OUTPATIENT
Start: 2023-03-05

## 2023-03-05 NOTE — TELEPHONE ENCOUNTER
Pharmacy sent prescription and it is unclear what dose he is taking currently so we can increase to next dose with refill.  Will have staff call patient and find out what dose of medication he is currently taking so he gets the correct dose.      Timothy Lozano MD

## 2023-03-10 NOTE — TELEPHONE ENCOUNTER
Additional prescription sent.  He is due for diabetes follow-up and has appointment in a month.  Will have staff notify patient of extra refill.    Timothy Lozano MD

## 2023-04-05 DIAGNOSIS — J31.0 CHRONIC RHINITIS: ICD-10-CM

## 2023-04-05 DIAGNOSIS — E66.01 MORBID OBESITY (H): ICD-10-CM

## 2023-04-05 DIAGNOSIS — E11.9 TYPE 2 DIABETES MELLITUS WITHOUT COMPLICATION, WITHOUT LONG-TERM CURRENT USE OF INSULIN (H): ICD-10-CM

## 2023-04-06 RX ORDER — MONTELUKAST SODIUM 10 MG/1
TABLET ORAL
Qty: 90 TABLET | Refills: 4 | Status: SHIPPED | OUTPATIENT
Start: 2023-04-06 | End: 2023-04-14

## 2023-04-06 RX ORDER — TIRZEPATIDE 15 MG/.5ML
INJECTION, SOLUTION SUBCUTANEOUS
Qty: 2 ML | Refills: 0 | Status: SHIPPED | OUTPATIENT
Start: 2023-04-06 | End: 2023-04-14

## 2023-04-07 ASSESSMENT — ENCOUNTER SYMPTOMS
DIZZINESS: 0
NAUSEA: 0
CHILLS: 0
FEVER: 0
FREQUENCY: 0
SHORTNESS OF BREATH: 0
DIARRHEA: 0
CONSTIPATION: 0
EYE PAIN: 0
ARTHRALGIAS: 0
DYSURIA: 0
JOINT SWELLING: 0
PARESTHESIAS: 0
SORE THROAT: 0
ABDOMINAL PAIN: 0
COUGH: 0
HEARTBURN: 0
HEADACHES: 0
PALPITATIONS: 0
HEMATURIA: 0
MYALGIAS: 0
HEMATOCHEZIA: 0
NERVOUS/ANXIOUS: 0
WEAKNESS: 0

## 2023-04-14 ENCOUNTER — OFFICE VISIT (OUTPATIENT)
Dept: FAMILY MEDICINE | Facility: CLINIC | Age: 53
End: 2023-04-14
Payer: COMMERCIAL

## 2023-04-14 VITALS
BODY MASS INDEX: 38.92 KG/M2 | WEIGHT: 278 LBS | OXYGEN SATURATION: 95 % | SYSTOLIC BLOOD PRESSURE: 122 MMHG | HEIGHT: 71 IN | HEART RATE: 92 BPM | DIASTOLIC BLOOD PRESSURE: 74 MMHG | TEMPERATURE: 97.2 F

## 2023-04-14 DIAGNOSIS — E66.01 SEVERE OBESITY (BMI 35.0-39.9) WITH COMORBIDITY (H): ICD-10-CM

## 2023-04-14 DIAGNOSIS — F41.1 GAD (GENERALIZED ANXIETY DISORDER): ICD-10-CM

## 2023-04-14 DIAGNOSIS — J31.0 CHRONIC RHINITIS: ICD-10-CM

## 2023-04-14 DIAGNOSIS — N52.2 DRUG-INDUCED ERECTILE DYSFUNCTION: ICD-10-CM

## 2023-04-14 DIAGNOSIS — E11.9 TYPE 2 DIABETES MELLITUS WITHOUT COMPLICATION, WITHOUT LONG-TERM CURRENT USE OF INSULIN (H): ICD-10-CM

## 2023-04-14 DIAGNOSIS — E78.2 MIXED HYPERLIPIDEMIA: ICD-10-CM

## 2023-04-14 DIAGNOSIS — Z00.00 ROUTINE GENERAL MEDICAL EXAMINATION AT A HEALTH CARE FACILITY: Primary | ICD-10-CM

## 2023-04-14 PROBLEM — R73.01 ELEVATED FASTING BLOOD SUGAR: Status: RESOLVED | Noted: 2021-06-27 | Resolved: 2023-04-14

## 2023-04-14 LAB
ANION GAP SERPL CALCULATED.3IONS-SCNC: 11 MMOL/L (ref 7–15)
BUN SERPL-MCNC: 13.7 MG/DL (ref 6–20)
CALCIUM SERPL-MCNC: 9.2 MG/DL (ref 8.6–10)
CHLORIDE SERPL-SCNC: 104 MMOL/L (ref 98–107)
CHOLEST SERPL-MCNC: 122 MG/DL
CREAT SERPL-MCNC: 0.83 MG/DL (ref 0.67–1.17)
CREAT UR-MCNC: 349.5 MG/DL
DEPRECATED HCO3 PLAS-SCNC: 26 MMOL/L (ref 22–29)
GFR SERPL CREATININE-BSD FRML MDRD: >90 ML/MIN/1.73M2
GLUCOSE SERPL-MCNC: 100 MG/DL (ref 70–99)
HBA1C MFR BLD: 5.6 %
HDLC SERPL-MCNC: 33 MG/DL
LDLC SERPL CALC-MCNC: 73 MG/DL
MICROALBUMIN UR-MCNC: 53.1 MG/L
MICROALBUMIN/CREAT UR: 15.19 MG/G CR (ref 0–17)
NONHDLC SERPL-MCNC: 89 MG/DL
POTASSIUM SERPL-SCNC: 4.4 MMOL/L (ref 3.4–5.3)
SODIUM SERPL-SCNC: 141 MMOL/L (ref 136–145)
TRIGL SERPL-MCNC: 78 MG/DL

## 2023-04-14 PROCEDURE — 36415 COLL VENOUS BLD VENIPUNCTURE: CPT | Performed by: FAMILY MEDICINE

## 2023-04-14 PROCEDURE — 82306 VITAMIN D 25 HYDROXY: CPT | Performed by: FAMILY MEDICINE

## 2023-04-14 PROCEDURE — 82043 UR ALBUMIN QUANTITATIVE: CPT | Performed by: FAMILY MEDICINE

## 2023-04-14 PROCEDURE — 90677 PCV20 VACCINE IM: CPT | Performed by: FAMILY MEDICINE

## 2023-04-14 PROCEDURE — 83036 HEMOGLOBIN GLYCOSYLATED A1C: CPT | Performed by: FAMILY MEDICINE

## 2023-04-14 PROCEDURE — 90636 HEP A/HEP B VACC ADULT IM: CPT | Performed by: FAMILY MEDICINE

## 2023-04-14 PROCEDURE — 82570 ASSAY OF URINE CREATININE: CPT | Performed by: FAMILY MEDICINE

## 2023-04-14 PROCEDURE — 80061 LIPID PANEL: CPT | Performed by: FAMILY MEDICINE

## 2023-04-14 PROCEDURE — 80048 BASIC METABOLIC PNL TOTAL CA: CPT | Performed by: FAMILY MEDICINE

## 2023-04-14 PROCEDURE — 90472 IMMUNIZATION ADMIN EACH ADD: CPT | Performed by: FAMILY MEDICINE

## 2023-04-14 PROCEDURE — 99207 PR FOOT EXAM NO CHARGE: CPT | Mod: 25 | Performed by: FAMILY MEDICINE

## 2023-04-14 PROCEDURE — 99396 PREV VISIT EST AGE 40-64: CPT | Mod: 25 | Performed by: FAMILY MEDICINE

## 2023-04-14 PROCEDURE — 90471 IMMUNIZATION ADMIN: CPT | Performed by: FAMILY MEDICINE

## 2023-04-14 RX ORDER — ATORVASTATIN CALCIUM 40 MG/1
40 TABLET, FILM COATED ORAL DAILY
Qty: 90 TABLET | Refills: 4 | Status: SHIPPED | OUTPATIENT
Start: 2023-04-14 | End: 2024-05-17

## 2023-04-14 RX ORDER — TIRZEPATIDE 15 MG/.5ML
15 INJECTION, SOLUTION SUBCUTANEOUS WEEKLY
Qty: 6 ML | Refills: 4 | Status: SHIPPED | OUTPATIENT
Start: 2023-04-14 | End: 2023-07-12

## 2023-04-14 RX ORDER — VENLAFAXINE HYDROCHLORIDE 150 MG/1
150 CAPSULE, EXTENDED RELEASE ORAL DAILY
Qty: 90 CAPSULE | Refills: 4 | Status: SHIPPED | OUTPATIENT
Start: 2023-04-14 | End: 2024-05-06

## 2023-04-14 RX ORDER — SILDENAFIL 100 MG/1
TABLET, FILM COATED ORAL
Qty: 10 TABLET | Refills: 11 | Status: SHIPPED | OUTPATIENT
Start: 2023-04-14 | End: 2024-05-17

## 2023-04-14 RX ORDER — MONTELUKAST SODIUM 10 MG/1
1 TABLET ORAL AT BEDTIME
Qty: 90 TABLET | Refills: 4 | Status: SHIPPED | OUTPATIENT
Start: 2023-04-14 | End: 2024-05-17

## 2023-04-14 RX ORDER — METFORMIN HCL 500 MG
2000 TABLET, EXTENDED RELEASE 24 HR ORAL
Qty: 360 TABLET | Refills: 4 | Status: SHIPPED | OUTPATIENT
Start: 2023-04-14 | End: 2023-11-17

## 2023-04-14 RX ORDER — METFORMIN HCL 500 MG
2000 TABLET, EXTENDED RELEASE 24 HR ORAL
Qty: 360 TABLET | Refills: 0 | Status: CANCELLED | OUTPATIENT
Start: 2023-04-14

## 2023-04-14 ASSESSMENT — ENCOUNTER SYMPTOMS
HEMATOCHEZIA: 0
NERVOUS/ANXIOUS: 0
JOINT SWELLING: 0
HEMATURIA: 0
CONSTIPATION: 0
SORE THROAT: 0
FEVER: 0
ARTHRALGIAS: 0
DIZZINESS: 0
HEARTBURN: 0
COUGH: 0
CHILLS: 0
EYE PAIN: 0
SHORTNESS OF BREATH: 0
PALPITATIONS: 0
ABDOMINAL PAIN: 0
DIARRHEA: 0
MYALGIAS: 0
WEAKNESS: 0
DYSURIA: 0
NAUSEA: 0
FREQUENCY: 0
HEADACHES: 0
PARESTHESIAS: 0

## 2023-04-14 NOTE — PROGRESS NOTES
SUBJECTIVE:   CC: John is an 52 year old who presents for preventative health visit.       4/14/2023    10:01 AM   Additional Questions   Roomed by marquis hercules ma     Patient has been advised of split billing requirements and indicates understanding: Yes  Healthy Habits:     Getting at least 3 servings of Calcium per day:  NO    Bi-annual eye exam:  Yes    Dental care twice a year:  Yes    Sleep apnea or symptoms of sleep apnea:  Excessive snoring    Diet:  Diabetic    Frequency of exercise:  1 day/week    Duration of exercise:  Less than 15 minutes    Taking medications regularly:  Yes    Medication side effects:  None    PHQ-2 Total Score: 0    Additional concerns today:  No              Today's PHQ-2 Score:       4/7/2023     4:17 PM   PHQ-2 ( 1999 [employer])   Q1: Little interest or pleasure in doing things 0   Q2: Feeling down, depressed or hopeless 0   PHQ-2 Score 0   Q1: Little interest or pleasure in doing things Not at all    Not at all   Q2: Feeling down, depressed or hopeless Not at all    Not at all   PHQ-2 Score 0    0           Social History     Tobacco Use     Smoking status: Never     Smokeless tobacco: Former   Vaping Use     Vaping status: Never Used     Passive vaping exposure: Yes   Substance Use Topics     Alcohol use: Yes             4/7/2023     4:17 PM   Alcohol Use   Prescreen: >3 drinks/day or >7 drinks/week? No       Last PSA: No results found for: PSA    Reviewed orders with patient. Reviewed health maintenance and updated orders accordingly - Yes      Reviewed and updated as needed this visit by clinical staff   Tobacco  Allergies  Meds              Reviewed and updated as needed this visit by Provider                     Review of Systems   Constitutional: Negative for chills and fever.   HENT: Negative for congestion, ear pain, hearing loss and sore throat.    Eyes: Negative for pain and visual disturbance.   Respiratory: Negative for cough and shortness of breath.    Cardiovascular:  "Negative for chest pain, palpitations and peripheral edema.   Gastrointestinal: Negative for abdominal pain, constipation, diarrhea, heartburn, hematochezia and nausea.   Genitourinary: Negative for dysuria, frequency, genital sores, hematuria, impotence, penile discharge and urgency.   Musculoskeletal: Negative for arthralgias, joint swelling and myalgias.   Skin: Negative for rash.   Neurological: Negative for dizziness, weakness, headaches and paresthesias.   Psychiatric/Behavioral: Negative for mood changes. The patient is not nervous/anxious.          OBJECTIVE:   /74   Pulse 92   Temp 97.2  F (36.2  C) (Temporal)   Ht 1.791 m (5' 10.5\")   Wt 126.1 kg (278 lb)   SpO2 95%   BMI 39.33 kg/m      Physical Exam  Constitutional:       General: He is not in acute distress.     Appearance: He is well-developed.   HENT:      Head: Normocephalic and atraumatic.      Right Ear: Hearing, tympanic membrane, ear canal and external ear normal.      Left Ear: Hearing, tympanic membrane, ear canal and external ear normal.      Nose: Nose normal.      Mouth/Throat:      Mouth: No oral lesions.      Pharynx: Uvula midline. No oropharyngeal exudate.   Eyes:      General: Lids are normal. No scleral icterus.        Right eye: No discharge.         Left eye: No discharge.      Extraocular Movements: Extraocular movements intact.      Conjunctiva/sclera: Conjunctivae normal.      Pupils: Pupils are equal, round, and reactive to light.   Neck:      Thyroid: No thyroid mass or thyromegaly.      Trachea: No tracheal deviation.   Cardiovascular:      Rate and Rhythm: Normal rate and regular rhythm.      Pulses: Normal pulses.      Heart sounds: Normal heart sounds, S1 normal and S2 normal. No murmur heard.     No S3 or S4 sounds.   Pulmonary:      Effort: Pulmonary effort is normal. No respiratory distress.      Breath sounds: Normal breath sounds. No wheezing, rhonchi or rales.   Abdominal:      General: Bowel sounds are " normal. There is no distension.      Palpations: Abdomen is soft. There is no mass.      Tenderness: There is no abdominal tenderness. There is no guarding.   Musculoskeletal:         General: No deformity. Normal range of motion.      Cervical back: Normal range of motion and neck supple.      Comments: FEET:  monofilament exam normal bilaterally.  Good hair growth.  Dorsalis pedis pulses 2+ bilaterally.  Feet warm.    Feet:      Right foot:      Skin integrity: No ulcer, blister, skin breakdown or erythema.      Left foot:      Skin integrity: No ulcer, blister, skin breakdown or erythema.   Lymphadenopathy:      Cervical: No cervical adenopathy.      Upper Body:      Right upper body: No supraclavicular adenopathy.      Left upper body: No supraclavicular adenopathy.   Skin:     General: Skin is warm and dry.      Findings: No lesion or rash.   Neurological:      Mental Status: He is alert and oriented to person, place, and time.      Motor: No abnormal muscle tone.      Deep Tendon Reflexes: Reflexes are normal and symmetric.   Psychiatric:         Speech: Speech normal.         Thought Content: Thought content normal.         Judgment: Judgment normal.               ASSESSMENT/PLAN:     ASSESSMENT/ORDERS:    ICD-10-CM    1. Routine general medical examination at a health care facility  Z00.00       2. Type 2 diabetes mellitus without complication, without long-term current use of insulin (H)  E11.9 tirzepatide (MOUNJARO) 15 MG/0.5ML pen     metFORMIN (GLUCOPHAGE XR) 500 MG 24 hr tablet     Albumin Random Urine Quantitative with Creat Ratio     Basic metabolic panel     Lipid panel reflex to direct LDL Fasting     Hemoglobin A1c     FOOT EXAM     Vitamin D Deficiency     Albumin Random Urine Quantitative with Creat Ratio     Basic metabolic panel     Lipid panel reflex to direct LDL Fasting     Hemoglobin A1c     Vitamin D Deficiency      3. Severe obesity (BMI 35.0-39.9) with comorbidity (H)  E66.01 Vitamin D  "Deficiency     Vitamin D Deficiency      4. Chronic rhinitis  J31.0 montelukast (SINGULAIR) 10 MG tablet      5. Mixed hyperlipidemia  E78.2 atorvastatin (LIPITOR) 40 MG tablet     Lipid panel reflex to direct LDL Fasting     Vitamin D Deficiency     Lipid panel reflex to direct LDL Fasting     Vitamin D Deficiency      6. Drug-induced erectile dysfunction  N52.2 sildenafil (VIAGRA) 100 MG tablet      7. GUS (generalized anxiety disorder)  F41.1 venlafaxine (EFFEXOR XR) 150 MG 24 hr capsule     Vitamin D Deficiency     Vitamin D Deficiency        PLAN:  1.  Discussed severe obesity with diabetes and help with Mounjaro (tirzepatide).  Continue healthy habits of diet/exercise.  2.   Medications refilled for all other above noted stable conditions.  Labs ordered as noted above.         Follow-up Visit   Expected date:  Oct 23, 2023 (Approximate)      Follow Up Appointment Details:     Follow-up with whom?: Me    Follow-Up for what?: Chronic Disease f/u    Chronic Disease f/u: Diabetes    How?: In Person or Virtual                           COUNSELING:   Reviewed preventive health counseling, as reflected in patient instructions      BMI:   Estimated body mass index is 39.33 kg/m  as calculated from the following:    Height as of this encounter: 1.791 m (5' 10.5\").    Weight as of this encounter: 126.1 kg (278 lb).   Weight management plan: Discussed healthy diet and exercise guidelines      He reports that he has never smoked. He has quit using smokeless tobacco.            Timothy Lozano MD  [facility]  "

## 2023-04-14 NOTE — PATIENT INSTRUCTIONS
AmLactin 1-2 times daily    Preventive Health Recommendations  Male Ages 50 - 64    Yearly exam:             See your health care provider every year in order to  o   Review health changes.   o   Discuss preventive care.    o   Review your medicines if your doctor has prescribed any.   Have a cholesterol test every 5 years, or more frequently if you are at risk for high cholesterol/heart disease.   Have a diabetes test (fasting glucose) every three years. If you are at risk for diabetes, you should have this test more often.   Have a colonoscopy at age 50, or have a yearly FIT test (stool test). These exams will check for colon cancer.    Talk with your health care provider about whether or not a prostate cancer screening test (PSA) is right for you.  You should be tested each year for STDs (sexually transmitted diseases), if you re at risk.     Shots: Get a flu shot each year. Get a tetanus shot every 10 years.     Nutrition:  Eat at least 5 servings of fruits and vegetables daily.   Eat whole-grain bread, whole-wheat pasta and brown rice instead of white grains and rice.   Get adequate Calcium and Vitamin D.     Lifestyle  Exercise for at least 150 minutes a week (30 minutes a day, 5 days a week). This will help you control your weight and prevent disease.   Limit alcohol to one drink per day.   No smoking.   Wear sunscreen to prevent skin cancer.   See your dentist every six months for an exam and cleaning.   See your eye doctor every 1 to 2 years.

## 2023-04-16 LAB — DEPRECATED CALCIDIOL+CALCIFEROL SERPL-MC: 34 UG/L (ref 20–75)

## 2023-04-23 NOTE — RESULT ENCOUNTER NOTE
John,  Your results look great.  Please let me know if you have any questions.    Sincerely,  Dr. Lozano

## 2023-05-08 ENCOUNTER — TELEPHONE (OUTPATIENT)
Dept: FAMILY MEDICINE | Facility: CLINIC | Age: 53
End: 2023-05-08
Payer: COMMERCIAL

## 2023-05-08 NOTE — TELEPHONE ENCOUNTER
Prior Authorization Retail Medication Request    Medication/Dose: tirzepatide (MOUNJARO) 15 MG/0.5ML pen  ICD code (if different than what is on RX):    Previously Tried and Failed:    Rationale:  Type 2 diabetes mellitus without complication, without long-term current use of insulin (H) [E11.9]     Insurance Name:  [facility]  Insurance ID:  [id number]      Pharmacy Information (if different than what is on RX)  [facility]  [facility]  Phone:  [phone]

## 2023-05-12 NOTE — TELEPHONE ENCOUNTER
Prior Authorization Not Needed per Insurance    Medication: tirzepatide (MOUNJARO) 15 MG/0.5ML pen  Insurance Company:    Expected CoPay:      [facility]illing [facility] Rx[facility] [place] - [address]  Pharmacy Notified: Yes  Patient Notified: No    Called [facility] to get insurance information. Pharmacy stated they do not have any insurance info- patient uses discount card for Rxs.

## 2023-09-01 ENCOUNTER — TRANSFERRED RECORDS (OUTPATIENT)
Dept: MULTI SPECIALTY CLINIC | Facility: CLINIC | Age: 53
End: 2023-09-01

## 2023-09-01 LAB — RETINOPATHY: NEGATIVE

## 2023-10-10 ENCOUNTER — MYC MEDICAL ADVICE (OUTPATIENT)
Dept: FAMILY MEDICINE | Facility: CLINIC | Age: 53
End: 2023-10-10

## 2023-10-10 DIAGNOSIS — E11.9 TYPE 2 DIABETES MELLITUS WITHOUT COMPLICATION, WITHOUT LONG-TERM CURRENT USE OF INSULIN (H): Primary | ICD-10-CM

## 2023-10-17 ENCOUNTER — TELEPHONE (OUTPATIENT)
Dept: FAMILY MEDICINE | Facility: CLINIC | Age: 53
End: 2023-10-17

## 2023-10-17 NOTE — TELEPHONE ENCOUNTER
Prior Authorization Retail Medication Request    Medication/Dose: dulaglutide (TRULICITY) 0.75 MG/0.5ML pen 2 mL  ICD code (if different than what is on RX):    Severe obesity (BMI 35.0-39.9) with comorbidity (H) [E66.01]  - Primary      Type 2 diabetes mellitus without complication, without long-term current use of insulin (H) [E11.9]        Previously Tried and Failed:    Rationale:      Insurance Name:  [facility] OF STATE   Insurance ID:  [id number]       Pharmacy Information (if different than what is on RX)  [facility]:  [facility]  Phone:  [phone]

## 2023-10-17 NOTE — TELEPHONE ENCOUNTER
I had diabetes listed, but apparently the weight loss code was on it as well.  I took that off and left only diabetes on.  Hopefully this works.    Will have staff notify patient.    Timothy Lozano MD

## 2023-10-19 NOTE — TELEPHONE ENCOUNTER
Central Prior Authorization Team   Phone: [phone]    PA Initiation    Medication: dulaglutide (TRULICITY) 0.75 MG/0.5ML pen  Insurance Company: [employer] - Phone [phone] Fax [phone]  Pharmacy Filling the Rx: [facility] [place] - [address]  Filling Pharmacy Phone: [phone]  Filling Pharmacy Fax:    Start Date: 10/19/2023

## 2023-10-26 NOTE — TELEPHONE ENCOUNTER
Prior Authorization Not Needed per Insurance    Medication: dulaglutide (TRULICITY) 0.75 MG/0.5ML pen  Insurance Company: [employer] - Phone [phone] Fax [phone]  Expected CoPay:      Ph[facility] Filling the Rx: [facility] - [place] - [address] [facility]  Pharmacy Notified:  Yes  Patient Notified:  Yes  **Instructed pharmacy to notify patient when script is ready to /ship.**      I called insurance and they stated that a PA is not needed and the pharmacy received a paid claim on 10/17.

## 2023-11-09 DIAGNOSIS — E11.9 TYPE 2 DIABETES MELLITUS WITHOUT COMPLICATION, WITHOUT LONG-TERM CURRENT USE OF INSULIN (H): ICD-10-CM

## 2023-11-09 RX ORDER — DULAGLUTIDE 0.75 MG/.5ML
INJECTION, SOLUTION SUBCUTANEOUS
Qty: 2 ML | Refills: 0 | Status: SHIPPED | OUTPATIENT
Start: 2023-11-09 | End: 2024-01-19 | Stop reason: DRUGHIGH

## 2023-11-14 ENCOUNTER — MYC MEDICAL ADVICE (OUTPATIENT)
Dept: FAMILY MEDICINE | Facility: CLINIC | Age: 53
End: 2023-11-14

## 2023-11-17 ENCOUNTER — OFFICE VISIT (OUTPATIENT)
Dept: FAMILY MEDICINE | Facility: CLINIC | Age: 53
End: 2023-11-17
Attending: FAMILY MEDICINE
Payer: COMMERCIAL

## 2023-11-17 VITALS
RESPIRATION RATE: 20 BRPM | OXYGEN SATURATION: 97 % | BODY MASS INDEX: 36.62 KG/M2 | WEIGHT: 270.38 LBS | HEART RATE: 94 BPM | DIASTOLIC BLOOD PRESSURE: 88 MMHG | TEMPERATURE: 98.6 F | HEIGHT: 72 IN | SYSTOLIC BLOOD PRESSURE: 128 MMHG

## 2023-11-17 DIAGNOSIS — E11.9 TYPE 2 DIABETES MELLITUS WITHOUT COMPLICATION, WITHOUT LONG-TERM CURRENT USE OF INSULIN (H): Primary | ICD-10-CM

## 2023-11-17 DIAGNOSIS — Z23 NEED FOR PROPHYLACTIC VACCINATION AND INOCULATION AGAINST INFLUENZA: ICD-10-CM

## 2023-11-17 DIAGNOSIS — E66.01 SEVERE OBESITY (BMI 35.0-39.9) WITH COMORBIDITY (H): ICD-10-CM

## 2023-11-17 LAB — HBA1C MFR BLD: 5.6 %

## 2023-11-17 PROCEDURE — 36415 COLL VENOUS BLD VENIPUNCTURE: CPT | Performed by: FAMILY MEDICINE

## 2023-11-17 PROCEDURE — 90472 IMMUNIZATION ADMIN EACH ADD: CPT | Performed by: FAMILY MEDICINE

## 2023-11-17 PROCEDURE — 90471 IMMUNIZATION ADMIN: CPT | Performed by: FAMILY MEDICINE

## 2023-11-17 PROCEDURE — 99214 OFFICE O/P EST MOD 30 MIN: CPT | Mod: 25 | Performed by: FAMILY MEDICINE

## 2023-11-17 PROCEDURE — 90636 HEP A/HEP B VACC ADULT IM: CPT | Performed by: FAMILY MEDICINE

## 2023-11-17 PROCEDURE — 90686 IIV4 VACC NO PRSV 0.5 ML IM: CPT | Performed by: FAMILY MEDICINE

## 2023-11-17 PROCEDURE — 83036 HEMOGLOBIN GLYCOSYLATED A1C: CPT | Performed by: FAMILY MEDICINE

## 2023-11-17 ASSESSMENT — PAIN SCALES - GENERAL: PAINLEVEL: NO PAIN (0)

## 2023-11-17 NOTE — Clinical Note
Please abstract the following data from this visit with this patient into the appropriate field in Epic:  Tests that can be patient reported without a hard copy:  Eye exam with ophthalmology on this date: 09/2023 Exam Location: No retinopathy   Other Tests found in the patient's chart through Chart Review/Care Everywhere:  {Abstract Quality List (Optional):596434}  Note to Abstraction: If this section is blank, no results were found via Chart Review/Care Everywhere.

## 2023-11-17 NOTE — RESULT ENCOUNTER NOTE
John,  Your results show your hemoglobin A1c continues to be great.  Go ahead and stop your metformin and message me in 3 weeks for next Trulicity (dulaglutide) dose.  Please let me know if you have any questions.    Sincerely,  Dr. Lozano

## 2023-11-17 NOTE — PROGRESS NOTES
"  Assessment & Plan     ASSESSMENT/ORDERS:    ICD-10-CM    1. Type 2 diabetes mellitus without complication, without long-term current use of insulin (H)  E11.9 Hemoglobin A1c     dulaglutide (TRULICITY) 1.5 MG/0.5ML pen     Hemoglobin A1c      2. Severe obesity (BMI 35.0-39.9) with comorbidity (H)  E66.01       3. Need for prophylactic vaccination and inoculation against influenza  Z23         PLAN:  1.  Hemoglobin A1c today.  Will stop metformin unless hemoglobin A1c in mid to high 6 range.  Will increase Trulicity (dulaglutide) for his severe obesity that we discussed.              BMI:   Estimated body mass index is 37.02 kg/m  as calculated from the following:    Height as of this encounter: 1.82 m (5' 11.65\").    Weight as of this encounter: 122.6 kg (270 lb 6 oz).           Timothy Lozano MD  [facility] DIGNA Lopez is a 53 year old, presenting for the following health issues:  Diabetes (Diabetes check)      11/17/2023     6:51 AM   Additional Questions   Roomed by Oralia       History of Present Illness         Blood sugars in 100s.  On low dose Trulicity (dulaglutide) and metformin.  Wants to increase Trulicity (dulaglutide) for weight management.  Was on Mounjaro (tirzepatide) in past and had good results, insurance stopped it.    Review of Systems         Objective    /88   Pulse 94   Temp 98.6  F (37  C) (Tympanic)   Resp 20   Ht 1.82 m (5' 11.65\")   Wt 122.6 kg (270 lb 6 oz)   SpO2 97%   BMI 37.02 kg/m    Body mass index is 37.02 kg/m .  Physical Exam  Constitutional:       Appearance: Normal appearance. He is well-developed. He is obese.   Cardiovascular:      Rate and Rhythm: Normal rate and regular rhythm.      Heart sounds: Normal heart sounds, S1 normal and S2 normal. No murmur heard.  Pulmonary:      Effort: Pulmonary effort is normal. No respiratory distress.      Breath sounds: Normal breath sounds. No wheezing, rhonchi or rales. "   Neurological:      Mental Status: He is alert.

## 2023-12-15 ENCOUNTER — NURSE TRIAGE (OUTPATIENT)
Dept: FAMILY MEDICINE | Facility: CLINIC | Age: 53
End: 2023-12-15

## 2023-12-15 ENCOUNTER — MYC MEDICAL ADVICE (OUTPATIENT)
Dept: FAMILY MEDICINE | Facility: CLINIC | Age: 53
End: 2023-12-15

## 2023-12-15 DIAGNOSIS — E11.9 TYPE 2 DIABETES MELLITUS WITHOUT COMPLICATION, WITHOUT LONG-TERM CURRENT USE OF INSULIN (H): ICD-10-CM

## 2023-12-15 RX ORDER — DULAGLUTIDE 3 MG/.5ML
3 INJECTION, SOLUTION SUBCUTANEOUS WEEKLY
Qty: 2 ML | Refills: 0 | Status: SHIPPED | OUTPATIENT
Start: 2023-12-15 | End: 2024-01-19 | Stop reason: DRUGHIGH

## 2023-12-15 NOTE — TELEPHONE ENCOUNTER
Patient is wondering if he can increase his dose of Trulicity.    He has been having issues with constipation- see triage note regarding this.    Oneyda Monsalve RN on 12/15/2023 at 1:16 PM

## 2023-12-15 NOTE — TELEPHONE ENCOUNTER
[facility]    Patient states he gets constipated on the medication and then he gets the runs.    He sates he is having stools every 3 days.  No stomach pain.  No blood in his stool.    Per protocol patient given home care advice.    Oneyda Monsalve RN on 12/15/2023 at 1:14 PM    Reason for Disposition   MILD constipation    Additional Information   Negative: Abdomen pain is main symptom and male   Negative: Abdomen pain is main symptom and female   Negative: Rectal bleeding or blood in stool is main symptom   Negative: Vomiting bile (green color)   Negative: Patient sounds very sick or weak to the triager   Negative: Constant abdominal pain lasting > 2 hours   Negative: Vomiting and abdomen looks much more swollen than usual   Negative: Rectal pain or fullness from fecal impaction (rectum full of stool) and NOT better after SITZ bath, suppository or enema   Negative: Abdomen is more swollen than usual   Negative: Last bowel movement (BM) > 4 days ago   Negative: Leaking stool   Negative: Intermittent mild abdominal pain and fever   Negative: Unable to have a bowel movement (BM) without manually removing stool (using finger to pull out stool or perform disimpaction)   Negative: Unable to have a bowel movement (BM) without using a laxative, suppository, or enema   Negative: Constipation persists > 1 week and no improvement after using CARE ADVICE   Negative: Weight loss greater than 10 pounds (5 kg) and not dieting   Negative: Pencil-like, narrow stools   Negative: Patient wants to be seen   Negative: Uses laxative (e.g., PEG / Miralax, Milk of Magnesia) or enema more than once a month   Negative: Constipation is a recurrent ongoing problem (i.e., < 3 BMs / week or straining > 25% of the time)   Negative: Minor bleeding from rectum (e.g., blood just on toilet paper, few drops, streaks on surface of normal formed BM) occurs more than twice    Protocols used: Constipation-A-[place]

## 2024-01-18 ENCOUNTER — MYC MEDICAL ADVICE (OUTPATIENT)
Dept: FAMILY MEDICINE | Facility: CLINIC | Age: 54
End: 2024-01-18
Payer: COMMERCIAL

## 2024-01-18 DIAGNOSIS — E11.9 TYPE 2 DIABETES MELLITUS WITHOUT COMPLICATION, WITHOUT LONG-TERM CURRENT USE OF INSULIN (H): ICD-10-CM

## 2024-01-18 NOTE — TELEPHONE ENCOUNTER
Patient is requesting an increased dose for Trulicity.     He also would like a prior authorization to be started to get back on Mounjaro. It looks like this medication was previously discontinued due to cost/formulary change on 7/12/23.    Please review and advise.    Earnestine Villanueva, ANDERSONN, RN

## 2024-01-19 RX ORDER — TIRZEPATIDE 2.5 MG/.5ML
2.5 INJECTION, SOLUTION SUBCUTANEOUS
Qty: 2 ML | Refills: 0 | Status: SHIPPED | OUTPATIENT
Start: 2024-01-19 | End: 2024-05-17

## 2024-01-19 RX ORDER — DULAGLUTIDE 4.5 MG/.5ML
4.5 INJECTION, SOLUTION SUBCUTANEOUS WEEKLY
Qty: 2 ML | Refills: 11 | Status: SHIPPED | OUTPATIENT
Start: 2024-01-19 | End: 2024-02-09 | Stop reason: SINTOL

## 2024-01-25 ENCOUNTER — TELEPHONE (OUTPATIENT)
Dept: FAMILY MEDICINE | Facility: CLINIC | Age: 54
End: 2024-01-25
Payer: COMMERCIAL

## 2024-01-25 DIAGNOSIS — E11.9 TYPE 2 DIABETES MELLITUS WITHOUT COMPLICATION, WITHOUT LONG-TERM CURRENT USE OF INSULIN (H): ICD-10-CM

## 2024-01-25 NOTE — TELEPHONE ENCOUNTER
Prior Authorization Retail Medication Request    Medication/Dose: tirzepatide (MOUNJARO) 2.5 MG/0.5ML pen, Inject 2.5 mg Subcutaneous every 7 days  Diagnosis and ICD code (if different than what is on RX):  Type 2 diabetes mellitus without complication, without long-term current use of insulin (H) [E11.9]     New/renewal/insurance change PA/secondary ins. PA:  Previously Tried and Failed:    Rationale:      Insurance   Primary: [employer] out of state  (pharmacy services [phone], provider [phone])  Insurance ID:  [id number]    Secondary (if applicable):  Insurance ID:      Pharmacy Information (if different than what is on RX)  Name:  [facility]  Phone:  [phone]  Fax:[phone]

## 2024-02-07 NOTE — TELEPHONE ENCOUNTER
Retail Pharmacy Prior Authorization Team   Phone: [phone]    PA Initiation    Medication: MOUNJARO 2.5 MG/0.5ML SC SOPN  Insurance Company: [employer] - Phone [phone] Fax [phone]  Pharmacy Filling the Rx: [id number] - [place], [place] - [address] [place] [address]  Filling Pharmacy Phone: [phone]  Filling Pharmacy Fax:    Start Date: 2/7/2024    Ins    BIN: [id number]  PCN: [id number]  GRP: [id number]  ID: [id number]

## 2024-02-09 PROBLEM — E11.9 TYPE 2 DIABETES MELLITUS WITHOUT COMPLICATION, WITHOUT LONG-TERM CURRENT USE OF INSULIN (H): Status: ACTIVE | Noted: 2021-12-14

## 2024-02-09 RX ORDER — LIRAGLUTIDE 6 MG/ML
1.8 INJECTION SUBCUTANEOUS DAILY
Qty: 27 ML | Refills: 1 | Status: SHIPPED | OUTPATIENT
Start: 2024-02-09 | End: 2024-02-09

## 2024-02-09 RX ORDER — LIRAGLUTIDE 6 MG/ML
1.8 INJECTION SUBCUTANEOUS DAILY
Qty: 27 ML | Refills: 1 | Status: SHIPPED | OUTPATIENT
Start: 2024-03-08 | End: 2024-04-24 | Stop reason: SINTOL

## 2024-02-09 RX ORDER — LIRAGLUTIDE 6 MG/ML
INJECTION SUBCUTANEOUS
Qty: 9 ML | Refills: 0 | Status: SHIPPED | OUTPATIENT
Start: 2024-02-09 | End: 2024-04-24 | Stop reason: SINTOL

## 2024-02-09 NOTE — TELEPHONE ENCOUNTER
Spoke with patient and informed of note below. Patient understood.     Closing encounter.   Imelda Maria MA

## 2024-02-09 NOTE — TELEPHONE ENCOUNTER
Patient is having side effects on Trulicity (dulaglutide).  See if he is willing to trial once daily Victoza (liraglutide).  Will have staff notify patient.     Timothy Lozano MD

## 2024-02-09 NOTE — TELEPHONE ENCOUNTER
Spoke with patient and informed of note below. Patient is really hoping to stick with Mounjaro. He was hoping not to take anything daily. But if its something he needs to do to show them to some how get back on Mounjaro patient will try. Send script to [facility] in [place].     Patient is also wondering if Victoza is an injection or pill? Does he need to wean off medication he is currently on?     Imelda Maria MA

## 2024-02-09 NOTE — TELEPHONE ENCOUNTER
Note: Due to record-high volumes, our turn-around is time taking longer than normal . We are currently 8 days behind in the pools.   We are working diligently to submit all requests in a timely manner and in the order they are received. Please only flag true urgent requests as high priority to the pool at this time.   If you have questions - please send a note/message in the active PA encounter and send back to the RPPA (Retail Pharmacy Prior Authorization) team [[id number]].    If you have more specific questions about our process please reach out to our supervisor Imelda Berrios.   Thank you!     PRIOR AUTHORIZATION DENIED    Medication: MOUNJARO 2.5 MG/0.5ML SC SOPN  Insurance Company: [employer] - Phone [phone] Fax [phone]  Denial Date: 2/8/2024  Denial Rational:             Appeal Information:     If provider would like to appeal please review the plan's reasons for denial listed above. Please utilize that information to complete letter and provide specific, detailed clinical information/rationale of your patient's health status to address their denial reasons.          Patient Notified: No

## 2024-02-16 DIAGNOSIS — E11.9 TYPE 2 DIABETES MELLITUS WITHOUT COMPLICATION, WITHOUT LONG-TERM CURRENT USE OF INSULIN (H): Primary | ICD-10-CM

## 2024-02-19 RX ORDER — PEN NEEDLE, DIABETIC 32GX 5/32"
NEEDLE, DISPOSABLE MISCELLANEOUS
Qty: 100 EACH | Refills: 11 | Status: SHIPPED | OUTPATIENT
Start: 2024-02-19

## 2024-03-07 ENCOUNTER — MYC MEDICAL ADVICE (OUTPATIENT)
Dept: FAMILY MEDICINE | Facility: CLINIC | Age: 54
End: 2024-03-07
Payer: COMMERCIAL

## 2024-03-08 NOTE — TELEPHONE ENCOUNTER
Called and LM for patient to call back. Please relay below. [employer] message sent as well.     Imelda Maria MA

## 2024-03-08 NOTE — TELEPHONE ENCOUNTER
"Will have staff notify patient that next prescription for Victoza (liraglutide) has been already sent to pharmacy (see medication list).    He needs a follow-up appointment to review his medication process.  Please help him schedule an appointment in a \"provider approval required\" slot    Timothy Lozano MD   "

## 2024-03-25 ENCOUNTER — MYC MEDICAL ADVICE (OUTPATIENT)
Dept: FAMILY MEDICINE | Facility: CLINIC | Age: 54
End: 2024-03-25
Payer: COMMERCIAL

## 2024-03-25 DIAGNOSIS — E11.9 TYPE 2 DIABETES MELLITUS WITHOUT COMPLICATION, WITHOUT LONG-TERM CURRENT USE OF INSULIN (H): ICD-10-CM

## 2024-03-25 NOTE — TELEPHONE ENCOUNTER
Called [facility] to request the exception to coverage form to be faxed to us. Spoke with Trang. Provided Trang with the [facility] fax number. She states she will fax it now.     Earnestine Villanueva, ANDERSONN, RN

## 2024-04-16 NOTE — TELEPHONE ENCOUNTER
Forms completed and placed in basket.  Please send for scanning after sent back to patient or wherever this is to go.    Timothy Lozano MD

## 2024-04-19 ENCOUNTER — TRANSFERRED RECORDS (OUTPATIENT)
Dept: HEALTH INFORMATION MANAGEMENT | Facility: CLINIC | Age: 54
End: 2024-04-19
Payer: COMMERCIAL

## 2024-05-06 DIAGNOSIS — F41.1 GAD (GENERALIZED ANXIETY DISORDER): ICD-10-CM

## 2024-05-06 RX ORDER — VENLAFAXINE HYDROCHLORIDE 150 MG/1
150 CAPSULE, EXTENDED RELEASE ORAL DAILY
Qty: 90 CAPSULE | Refills: 0 | Status: SHIPPED | OUTPATIENT
Start: 2024-05-06 | End: 2024-05-17

## 2024-05-12 SDOH — HEALTH STABILITY: PHYSICAL HEALTH: ON AVERAGE, HOW MANY MINUTES DO YOU ENGAGE IN EXERCISE AT THIS LEVEL?: 40 MIN

## 2024-05-12 SDOH — HEALTH STABILITY: PHYSICAL HEALTH: ON AVERAGE, HOW MANY DAYS PER WEEK DO YOU ENGAGE IN MODERATE TO STRENUOUS EXERCISE (LIKE A BRISK WALK)?: 1 DAY

## 2024-05-12 ASSESSMENT — SOCIAL DETERMINANTS OF HEALTH (SDOH): HOW OFTEN DO YOU GET TOGETHER WITH FRIENDS OR RELATIVES?: ONCE A WEEK

## 2024-05-17 ENCOUNTER — OFFICE VISIT (OUTPATIENT)
Dept: FAMILY MEDICINE | Facility: CLINIC | Age: 54
End: 2024-05-17
Attending: FAMILY MEDICINE
Payer: COMMERCIAL

## 2024-05-17 VITALS
TEMPERATURE: 97.4 F | DIASTOLIC BLOOD PRESSURE: 80 MMHG | HEIGHT: 72 IN | SYSTOLIC BLOOD PRESSURE: 132 MMHG | RESPIRATION RATE: 16 BRPM | OXYGEN SATURATION: 97 % | WEIGHT: 299.13 LBS | HEART RATE: 84 BPM | BODY MASS INDEX: 40.51 KG/M2

## 2024-05-17 DIAGNOSIS — E11.9 TYPE 2 DIABETES MELLITUS WITHOUT COMPLICATION, WITHOUT LONG-TERM CURRENT USE OF INSULIN (H): ICD-10-CM

## 2024-05-17 DIAGNOSIS — E66.01 MORBID OBESITY (H): ICD-10-CM

## 2024-05-17 DIAGNOSIS — E78.2 MIXED HYPERLIPIDEMIA: ICD-10-CM

## 2024-05-17 DIAGNOSIS — J31.0 CHRONIC RHINITIS: ICD-10-CM

## 2024-05-17 DIAGNOSIS — N52.2 DRUG-INDUCED ERECTILE DYSFUNCTION: ICD-10-CM

## 2024-05-17 DIAGNOSIS — Z00.00 ROUTINE GENERAL MEDICAL EXAMINATION AT A HEALTH CARE FACILITY: Primary | ICD-10-CM

## 2024-05-17 DIAGNOSIS — F41.1 GAD (GENERALIZED ANXIETY DISORDER): ICD-10-CM

## 2024-05-17 LAB
ANION GAP SERPL CALCULATED.3IONS-SCNC: 10 MMOL/L (ref 7–15)
BUN SERPL-MCNC: 15.6 MG/DL (ref 6–20)
CALCIUM SERPL-MCNC: 9.5 MG/DL (ref 8.6–10)
CHLORIDE SERPL-SCNC: 104 MMOL/L (ref 98–107)
CHOLEST SERPL-MCNC: 162 MG/DL
CREAT SERPL-MCNC: 0.97 MG/DL (ref 0.67–1.17)
CREAT UR-MCNC: 214.4 MG/DL
DEPRECATED HCO3 PLAS-SCNC: 25 MMOL/L (ref 22–29)
EGFRCR SERPLBLD CKD-EPI 2021: >90 ML/MIN/1.73M2
FASTING STATUS PATIENT QL REPORTED: YES
FASTING STATUS PATIENT QL REPORTED: YES
GLUCOSE SERPL-MCNC: 148 MG/DL (ref 70–99)
HBA1C MFR BLD: 6 %
HDLC SERPL-MCNC: 43 MG/DL
LDLC SERPL CALC-MCNC: 105 MG/DL
MICROALBUMIN UR-MCNC: 12.1 MG/L
MICROALBUMIN/CREAT UR: 5.64 MG/G CR (ref 0–17)
NONHDLC SERPL-MCNC: 119 MG/DL
POTASSIUM SERPL-SCNC: 4.5 MMOL/L (ref 3.4–5.3)
SODIUM SERPL-SCNC: 139 MMOL/L (ref 135–145)
TRIGL SERPL-MCNC: 69 MG/DL

## 2024-05-17 PROCEDURE — 82043 UR ALBUMIN QUANTITATIVE: CPT | Performed by: FAMILY MEDICINE

## 2024-05-17 PROCEDURE — 82570 ASSAY OF URINE CREATININE: CPT | Performed by: FAMILY MEDICINE

## 2024-05-17 PROCEDURE — 90636 HEP A/HEP B VACC ADULT IM: CPT | Performed by: FAMILY MEDICINE

## 2024-05-17 PROCEDURE — 80061 LIPID PANEL: CPT | Performed by: FAMILY MEDICINE

## 2024-05-17 PROCEDURE — 90471 IMMUNIZATION ADMIN: CPT | Performed by: FAMILY MEDICINE

## 2024-05-17 PROCEDURE — 36415 COLL VENOUS BLD VENIPUNCTURE: CPT | Performed by: FAMILY MEDICINE

## 2024-05-17 PROCEDURE — 80048 BASIC METABOLIC PNL TOTAL CA: CPT | Performed by: FAMILY MEDICINE

## 2024-05-17 PROCEDURE — 83036 HEMOGLOBIN GLYCOSYLATED A1C: CPT | Performed by: FAMILY MEDICINE

## 2024-05-17 PROCEDURE — 99207 PR FOOT EXAM NO CHARGE: CPT | Performed by: FAMILY MEDICINE

## 2024-05-17 PROCEDURE — 99396 PREV VISIT EST AGE 40-64: CPT | Mod: 25 | Performed by: FAMILY MEDICINE

## 2024-05-17 RX ORDER — MONTELUKAST SODIUM 10 MG/1
1 TABLET ORAL AT BEDTIME
Qty: 90 TABLET | Refills: 4 | Status: SHIPPED | OUTPATIENT
Start: 2024-05-17

## 2024-05-17 RX ORDER — ATORVASTATIN CALCIUM 40 MG/1
40 TABLET, FILM COATED ORAL DAILY
Qty: 90 TABLET | Refills: 4 | Status: SHIPPED | OUTPATIENT
Start: 2024-05-17

## 2024-05-17 RX ORDER — TIRZEPATIDE 2.5 MG/.5ML
2.5 INJECTION, SOLUTION SUBCUTANEOUS
Qty: 2 ML | Refills: 0 | Status: SHIPPED | OUTPATIENT
Start: 2024-05-17 | End: 2024-06-10 | Stop reason: DRUGHIGH

## 2024-05-17 RX ORDER — VENLAFAXINE HYDROCHLORIDE 150 MG/1
150 CAPSULE, EXTENDED RELEASE ORAL DAILY
Qty: 90 CAPSULE | Refills: 4 | Status: SHIPPED | OUTPATIENT
Start: 2024-05-17

## 2024-05-17 RX ORDER — SILDENAFIL 100 MG/1
TABLET, FILM COATED ORAL
Qty: 10 TABLET | Refills: 11 | Status: SHIPPED | OUTPATIENT
Start: 2024-05-17

## 2024-05-17 NOTE — PROGRESS NOTES
Prior to immunization administration, verified patients identity using patient s name and date of birth. Please see Immunization Activity for additional information.     Screening Questionnaire for Adult Immunization    Are you sick today?   No   Do you have allergies to medications, food, a vaccine component or latex?   No   Have you ever had a serious reaction after receiving a vaccination?   No   Do you have a long-term health problem with heart, lung, kidney, or metabolic disease (e.g., diabetes), asthma, a blood disorder, no spleen, complement component deficiency, a cochlear implant, or a spinal fluid leak?  Are you on long-term aspirin therapy?   No   Do you have cancer, leukemia, HIV/AIDS, or any other immune system problem?   No   Do you have a parent, brother, or sister with an immune system problem?   No   In the past 3 months, have you taken medications that affect  your immune system, such as prednisone, other steroids, or anticancer drugs; drugs for the treatment of rheumatoid arthritis, Crohn s disease, or psoriasis; or have you had radiation treatments?   No   Have you had a seizure, or a brain or other nervous system problem?   No   During the past year, have you received a transfusion of blood or blood    products, or been given immune (gamma) globulin or antiviral drug?   No   For women: Are you pregnant or is there a chance you could become       pregnant during the next month?   No   Have you received any vaccinations in the past 4 weeks?   No     Immunization questionnaire answers were all negative.      Patient instructed to remain in clinic for 15 minutes afterwards, and to report any adverse reactions.     Screening performed by Kim Farfan MA on 5/17/2024 at 11:30 AM.

## 2024-05-17 NOTE — PROGRESS NOTES
Preventive Care Visit  [facility]  Timothy Lozano MD, Family Medicine  May 17, 2024      Assessment & Plan     ASSESSMENT/ORDERS:    ICD-10-CM    1. Routine general medical examination at a health care facility  Z00.00       2. Type 2 diabetes mellitus without complication, without long-term current use of insulin (H)  E11.9 Lipid panel reflex to direct LDL Fasting     Hemoglobin A1c     Basic metabolic panel     Albumin Random Urine Quantitative with Creat Ratio     FOOT EXAM     tirzepatide (MOUNJARO) 2.5 MG/0.5ML pen     Lipid panel reflex to direct LDL Fasting     Hemoglobin A1c     Basic metabolic panel     Albumin Random Urine Quantitative with Creat Ratio      3. Morbid obesity (H)  E66.01       4. Chronic rhinitis  J31.0 montelukast (SINGULAIR) 10 MG tablet      5. Drug-induced erectile dysfunction  N52.2 sildenafil (VIAGRA) 100 MG tablet      6. GUS (generalized anxiety disorder)  F41.1 venlafaxine (EFFEXOR XR) 150 MG 24 hr capsule      7. Mixed hyperlipidemia  E78.2 atorvastatin (LIPITOR) 40 MG tablet        PLAN:    Medications refilled for all other above noted stable conditions.  Labs ordered as noted above.             BMI  Estimated body mass index is 40.57 kg/m  as calculated from the following:    Height as of this encounter: 1.829 m (6').    Weight as of this encounter: 135.7 kg (299 lb 2 oz).   Weight management plan: Discussed healthy diet and exercise guidelines    Counseling  Appropriate preventive services were discussed with this patient, including applicable screening as appropriate for fall prevention, nutrition, physical activity, Tobacco-use cessation, weight loss and cognition.  Checklist reviewing preventive services available has been given to the patient.  Reviewed patient's diet, addressing concerns and/or questions.   He is at risk for lack of exercise and has been provided with information to increase physical activity for the benefit of his  well-being.   The patient reports drinking more than one alcoholic drink per day and sometimes engages in binge or excessive drinking. The patient was counseled and given information about possible harmful effects of excessive alcohol intake as well as where to get help for alcohol problems.         Osito Lopez is a 53 year old, presenting for the following:  Physical        5/17/2024     9:59 AM   Additional Questions   Roomed by Kim HARGROVE MA        Health Care Directive  Patient does not have a Health Care Directive or Living Will: Discussed advance care planning with patient; information given to patient to review.    HPI              5/12/2024   General Health   How would you rate your overall physical health? (!) FAIR   Feel stress (tense, anxious, or unable to sleep) Not at all         5/12/2024   Nutrition   Three or more servings of calcium each day? Yes   Diet: I don't know   How many servings of fruit and vegetables per day? (!) 0-1   How many sweetened beverages each day? 0-1         5/12/2024   Exercise   Days per week of moderate/strenous exercise 1 day   Average minutes spent exercising at this level 40 min   (!) EXERCISE CONCERN      5/12/2024   Social Factors   Frequency of gathering with friends or relatives Once a week   Worry food won't last until get money to buy more No   Food not last or not have enough money for food? No   Do you have housing?  Yes   Are you worried about losing your housing? No   Lack of transportation? No   Unable to get utilities (heat,electricity)? No         5/12/2024   Fall Risk   Fallen 2 or more times in the past year? No   Trouble with walking or balance? No          5/12/2024   Dental   Dentist two times every year? Yes         5/12/2024   TB Screening   Were you born outside of the [place]? No         Today's PHQ-2 Score:       5/16/2024    10:52 PM   PHQ-2 ( 1999 [employer])   Q1: Little interest or pleasure in doing things 0   Q2: Feeling down, depressed or  "hopeless 0   PHQ-2 Score 0   Q1: Little interest or pleasure in doing things Not at all   Q2: Feeling down, depressed or hopeless Not at all   PHQ-2 Score 0           5/12/2024   Substance Use   Alcohol more than 3/day or more than 7/wk Yes   How often do you have a drink containing alcohol 2 to 4 times a month   How many alcohol drinks on typical day 3 or 4   How often do you have 5+ drinks at one occasion Monthly   Audit 2/3 Score 3   How often not able to stop drinking once started Never   How often failed to do what normally expected Never   How often needed first drink in am after a heavy drinking session Never   How often feeling of guilt or remorse after drinking Never   How often unable to remember what happened the night before Never   Have you or someone else been injured because of your drinking No   Has anyone been concerned or suggested you cut down on drinking No   TOTAL SCORE - AUDIT 5   Do you use any other substances recreationally? No     Social History     Tobacco Use    Smoking status: Never    Smokeless tobacco: Former     Quit date: 8/15/2020   Vaping Use    Vaping status: Never Used   Substance Use Topics    Alcohol use: Yes    Drug use: Never           5/12/2024   STI Screening   New sexual partner(s) since last STI/HIV test? No   Last PSA: No results found for: \"PSA\"  ASCVD Risk   The 10-year ASCVD risk score (Juan M BARAJAS, et al., 2019) is: 10%    Values used to calculate the score:      Age: 53 years      Sex: Male      Is Non- : No      Diabetic: Yes      Tobacco smoker: No      Systolic Blood Pressure: 132 mmHg      Is BP treated: No      HDL Cholesterol: 43 mg/dL      Total Cholesterol: 162 mg/dL           Reviewed and updated as needed this visit by Provider   Tobacco  Allergies  Meds  Problems  Med Hx  Surg Hx  Fam Hx                     Objective    Exam  /80   Pulse 84   Temp 97.4  F (36.3  C) (Temporal)   Resp 16   Ht 1.829 m (6')   " Wt 135.7 kg (299 lb 2 oz)   SpO2 97%   BMI 40.57 kg/m     Estimated body mass index is 40.57 kg/m  as calculated from the following:    Height as of this encounter: 1.829 m (6').    Weight as of this encounter: 135.7 kg (299 lb 2 oz).    Physical Exam  Constitutional:       General: He is not in acute distress.     Appearance: He is well-developed. He is morbidly obese.   HENT:      Head: Normocephalic and atraumatic.      Right Ear: Hearing, tympanic membrane, ear canal and external ear normal.      Left Ear: Hearing, tympanic membrane, ear canal and external ear normal.      Nose: Nose normal.      Mouth/Throat:      Mouth: No oral lesions.      Pharynx: Uvula midline. No oropharyngeal exudate.   Eyes:      General: Lids are normal. No scleral icterus.        Right eye: No discharge.         Left eye: No discharge.      Extraocular Movements: Extraocular movements intact.      Conjunctiva/sclera: Conjunctivae normal.      Pupils: Pupils are equal, round, and reactive to light.   Neck:      Thyroid: No thyroid mass or thyromegaly.      Trachea: No tracheal deviation.   Cardiovascular:      Rate and Rhythm: Normal rate and regular rhythm.      Pulses: Normal pulses.      Heart sounds: Normal heart sounds, S1 normal and S2 normal. No murmur heard.     No S3 or S4 sounds.   Pulmonary:      Effort: Pulmonary effort is normal. No respiratory distress.      Breath sounds: Normal breath sounds. No wheezing or rales.   Abdominal:      General: Bowel sounds are normal. There is no distension.      Palpations: Abdomen is soft. There is no mass.      Tenderness: There is no abdominal tenderness. There is no guarding.   Musculoskeletal:         General: No deformity. Normal range of motion.      Cervical back: Normal range of motion and neck supple.   Lymphadenopathy:      Cervical: No cervical adenopathy.      Upper Body:      Right upper body: No supraclavicular adenopathy.      Left upper body: No supraclavicular  adenopathy.   Skin:     General: Skin is warm and dry.      Findings: No lesion or rash.   Neurological:      Mental Status: He is alert and oriented to person, place, and time.      Motor: No abnormal muscle tone.      Deep Tendon Reflexes: Reflexes are normal and symmetric.   Psychiatric:         Speech: Speech normal.         Thought Content: Thought content normal.         Judgment: Judgment normal.               Signed Electronically by: Timothy Lozano MD

## 2024-05-17 NOTE — PATIENT INSTRUCTIONS
"Preventive Care Advice   This is general advice we often give to help people stay healthy. Your care team may have specific advice just for you. Please talk to your care team about your own preventive care needs.  Lifestyle  Exercise at least 150 minutes each week (30 minutes a day, 5 days a week).  Do muscle strengthening activities 2 days a week. These help control your weight and prevent disease.  No smoking.  Wear sunscreen to prevent skin cancer.  Have your home tested for radon every 2 to 5 years. Radon is a colorless, odorless gas that can harm your lungs. To learn more, go to www.health.[facility].[place]. and search for \"Radon in Homes.\"  Keep guns unloaded and locked up in a safe place like a safe or gun vault, or, use a gun lock and hide the keys. Always lock away bullets separately. To learn more, visit [employer].[place].gov and search for \"safe gun storage.\"  Nutrition  Eat 5 or more servings of fruits and vegetables each day.  Try wheat bread, brown rice and whole grain pasta (instead of white bread, rice, and pasta).  Get enough calcium and vitamin D. Check the label on foods and aim for 100% of the RDA (recommended daily allowance).  Regular exams  Have a dental exam and cleaning every 6 months.  See your health care team every year to talk about:  Any changes in your health.  Any medicines your care team has prescribed.  Preventive care, family planning, and ways to prevent chronic diseases.  Shots (vaccines)   HPV shots (up to age 26), if you've never had them before.  Hepatitis B shots (up to age 59), if you've never had them before.  COVID-19 shot: Get this shot when it's due.  Flu shot: Get a flu shot every year.  Tetanus shot: Get a tetanus shot every 10 years.  Pneumococcal, hepatitis A, and RSV shots: Ask your care team if you need these based on your risk.  Shingles shot (for age 50 and up).  General health tests  Diabetes screening:  Starting at age 35, Get screened for diabetes at least every 3 years.  If " you are younger than age 35, ask your care team if you should be screened for diabetes.  Cholesterol test: At age 39, start having a cholesterol test every 5 years, or more often if advised.  Bone density scan (DEXA): At age 50, ask your care team if you should have this scan for osteoporosis (brittle bones).  Hepatitis C: Get tested at least once in your life.  Abdominal aortic aneurysm screening: Talk to your doctor about having this screening if you:  Have ever smoked; and  Are biologically male; and  Are between the ages of 65 and 75.  STIs (sexually transmitted infections)  Before age 24: Ask your care team if you should be screened for STIs.  After age 24: Get screened for STIs if you're at risk. You are at risk for STIs (including HIV) if:  You are sexually active with more than one person.  You don't use condoms every time.  You or a partner was diagnosed with a sexually transmitted infection.  If you are at risk for HIV, ask about PrEP medicine to prevent HIV.  Get tested for HIV at least once in your life, whether you are at risk for HIV or not.  Cancer screening tests  Cervical cancer screening: If you have a cervix, begin getting regular cervical cancer screening tests at age 21. Most people who have regular screenings with normal results can stop after age 65. Talk about this with your provider.  Breast cancer scan (mammogram): If you've ever had breasts, begin having regular mammograms starting at age 40. This is a scan to check for breast cancer.  Colon cancer screening: It is important to start screening for colon cancer at age 45.  Have a colonoscopy test every 10 years (or more often if you're at risk) Or, ask your provider about stool tests like a FIT test every year or Cologuard test every 3 years.  To learn more about your testing options, visit: www.[employer]/615079.pdf.  For help making a decision, visit: carmelo/pk47837.  Prostate cancer screening test: If you have a prostate and are age 55  to 69, ask your provider if you would benefit from a yearly prostate cancer screening test.  Lung cancer screening: If you are a current or former smoker age 50 to 80, ask your care team if ongoing lung cancer screenings are right for you.  For informational purposes only. Not to replace the advice of your health care provider. Copyright   2023 [facility]. All rights reserved. Clinically reviewed by the [facility]. [employer] 819029 - REV 04/24.    Learning About Alcohol Use Disorder  What is alcohol use disorder?  Alcohol use disorder means that a person drinks alcohol even though it causes harm to themselves or others. It can range from mild to severe. The more symptoms of this disorder you have, the more severe it may be. People who have it may find it hard to control their use of alcohol.  People who have this disorder may argue with others about how much they're drinking. Their job may be affected because of drinking. They may drink when it's dangerous or illegal, such as when they drive. They also may have a strong need, or craving, to drink. They may feel like they must drink just to get by. Their drinking may increase their risk of getting hurt or being in a car crash.  Over time, drinking too much alcohol may cause health problems. These may include high blood pressure, liver problems, or problems with digestion.  What are the symptoms?  Maybe you've wondered about your alcohol habits or how to tell if your drinking is becoming a problem.  Here are some of the symptoms of alcohol use disorder. You may have it if you have two or more of the following symptoms:  You drink larger amounts of alcohol than you ever meant to. Or you've been drinking for a longer time than you ever meant to.  You can't cut down or control your use. Or you constantly wish you could cut down.  You spend a lot of time getting or drinking alcohol or recovering from its effects.  You have strong  cravings for alcohol.  You can no longer do your main jobs at work, at school, or at home.  You keep drinking alcohol, even though your use hurts your relationships.  You have stopped doing important activities because of your alcohol use.  You drink alcohol in situations where doing so is dangerous.  You keep drinking alcohol even though you know it's causing health problems.  You need more and more alcohol to get the same effect, or you get less effect from the same amount over time. This is called tolerance.  You have uncomfortable symptoms when you stop drinking alcohol or use less. This is called withdrawal.  Alcohol use disorder can range from mild to severe. The more symptoms you have, the more severe the disorder may be.  You might not realize that your drinking is a problem. You might not drink large amounts when you drink. Or you might go for days or weeks between drinking episodes. But even if you don't drink very often, your drinking could still be harmful and put you at risk.  How is alcohol use disorder treated?  Getting help is up to you. But you don't have to do it alone. There are many people and kinds of treatments that can help.  Treatment for alcohol use disorder can include:  Group therapy, one or more types of counseling, and alcohol education.  Medicines that help to:  Reduce withdrawal symptoms and help you safely stop drinking.  Reduce cravings for alcohol.  Support groups. These groups include Alcoholics Anonymous and Your Tribute (Self-Management and Recovery Training).  Some people are able to stop or cut back on drinking with help from a counselor. People who have moderate to severe alcohol use disorder may need medical treatment. They may need to stay in a hospital or treatment center.  You may have a treatment team to help you. This team may include a psychologist or psychiatrist, counselors, doctors, social workers, nurses, and a . A  helps plan and manage  "your treatment.  Follow-up care is a key part of your treatment and safety. Be sure to make and go to all appointments, and call your doctor if you are having problems. It's also a good idea to know your test results and keep a list of the medicines you take.  Where can you learn more?  Go to https://www.[employer].net/patiented  Enter H758 in the search box to learn more about \"Learning About Alcohol Use Disorder.\"  Current as of: November 15, 2023               Content Version: 14.0    0920-2363 [employer].   Care instructions adapted under license by your healthcare professional. If you have questions about a medical condition or this instruction, always ask your healthcare professional. [employer] disclaims any warranty or liability for your use of this information.      "

## 2024-05-24 ENCOUNTER — TELEPHONE (OUTPATIENT)
Dept: FAMILY MEDICINE | Facility: CLINIC | Age: 54
End: 2024-05-24
Payer: COMMERCIAL

## 2024-05-24 NOTE — TELEPHONE ENCOUNTER
Pharmacy fax received stating that a PA is needed for Mounjaro. Upon chart view, a prior authorization was completed 1/25/24 and denied due to need to try formulary options first. Chart view shows that Pt tried Victoza and Bydureon in the past recently. Please advise is another PA is needed?      Rashmi Zapata MA

## 2024-05-29 NOTE — TELEPHONE ENCOUNTER
Please sent to prior authorization team.  He has  tried and failed:    Has tried and failed or has side effects on Ozempic (semaglutide), exenatide, Trulicity (dulaglutide), Victoza (liraglutide)     This was noted on the prescription sent to the pharmacy.     Timothy Lozano MD

## 2024-05-29 NOTE — TELEPHONE ENCOUNTER
Prior Authorization Retail Medication Request     Medication/Dose: tirzepatide (MOUNJARO) 2.5 MG/0.5ML pen, Inject 2.5 mg Subcutaneous every 7 days  Diagnosis and ICD code (if different than what is on RX):  Type 2 diabetes mellitus without complication, without long-term current use of insulin (H) [E11.9]      New/renewal/insurance change PA/secondary ins. PA:  Previously Tried and Failed or has side effects on Ozempic (semaglutide), exenatide, Trulicity (dulaglutide), Victoza (liraglutide)   Rationale:       Insurance   Primary: BCBS out of state  (pharmacy services [phone], provider [phone])  Insurance ID:  [id number]     Secondary (if applicable):  Insurance ID:       Pharmacy Information (if different than what is on RX)  Name:  Niya Clemons  Phone:  [phone]  Fax:[phone]

## 2024-06-05 NOTE — TELEPHONE ENCOUNTER
PA Initiation    Medication: MOUNJARO 2.5 MG/0.5ML SC SOPN  Insurance Company: [employer] - Phone [phone] Fax [phone]  Pharmacy Filling the Rx: [id number] - [facility] - [address] [place] [address]  Filling Pharmacy Phone: [phone]  Filling Pharmacy Fax:    Start Date: 6/5/2024

## 2024-06-06 NOTE — TELEPHONE ENCOUNTER
Prior Authorization Approval    Medication: MOUNJARO 2.5 MG/0.5ML SC SOPN  Authorization Effective Date: 6/5/2024  Authorization Expiration Date: 6/4/2025  Approved Dose/Quantity: 2/28  Reference #: [id number]   Insurance Company: [employer] - Phone [phone] Fax [phone]  Expected CoPay: $    CoPay Card Available:      Financial Assistance Needed:   Which Pharmacy is filling the prescription: [id number] - [place] - [address] [place] [address]  Pharmacy Notified: Yes  Patient Notified: Yes

## 2024-06-07 DIAGNOSIS — E11.9 TYPE 2 DIABETES MELLITUS WITHOUT COMPLICATION, WITHOUT LONG-TERM CURRENT USE OF INSULIN (H): Primary | ICD-10-CM

## 2024-06-10 RX ORDER — TIRZEPATIDE 5 MG/.5ML
INJECTION, SOLUTION SUBCUTANEOUS
Qty: 2 ML | Refills: 0 | Status: SHIPPED | OUTPATIENT
Start: 2024-06-10 | End: 2024-08-23 | Stop reason: DRUGHIGH

## 2024-06-18 ENCOUNTER — MYC MEDICAL ADVICE (OUTPATIENT)
Dept: FAMILY MEDICINE | Facility: CLINIC | Age: 54
End: 2024-06-18
Payer: COMMERCIAL

## 2024-07-17 ENCOUNTER — NURSE TRIAGE (OUTPATIENT)
Dept: FAMILY MEDICINE | Facility: CLINIC | Age: 54
End: 2024-07-17
Payer: COMMERCIAL

## 2024-07-17 NOTE — TELEPHONE ENCOUNTER
"C2C on file:\" 7/12/19.  Onset sore throat and headache Monday.  States symptoms still present but now feels it's settling in his chest.  States is feeling some shortness of breath at times.  Hasn't checked temp.  Reports is having sweats.  Is able to speak in full sentences.  No known exposure to influenza, COVID19, strep throat. No cold symptoms.  No home test done for COVID19.  Wife also now starting symptoms. Requesting appointment today; wanting virtual.  Did inform patient and wife that if he is having any breathing concerns he will need to be seen in person.  She states lives in [place];  states will go to urgent care in [place] today.  Valencia BOBO RN  Reason for Disposition   Patient wants to be seen    Additional Information   Negative: SEVERE difficulty breathing (e.g., struggling for each breath, speaks in single words)   Negative: Sounds like a life-threatening emergency to the triager   Negative: Throat culture results, call about   Negative: Productive cough is main symptom   Negative: Runny nose is main symptom   Negative: Drooling or spitting out saliva (because can't swallow)   Negative: Unable to open mouth completely   Negative: Drinking very little and has signs of dehydration (e.g., no urine > 12 hours, very dry mouth, very lightheaded)   Negative: Patient sounds very sick or weak to the triager   Negative: Difficulty breathing (per caller) but not severe   Negative: Fever > 103 F (39.4 C)   Negative: Refuses to drink anything for > 12 hours    Protocols used: Sore Throat-A-OH    "

## 2024-07-25 ENCOUNTER — MYC MEDICAL ADVICE (OUTPATIENT)
Dept: FAMILY MEDICINE | Facility: CLINIC | Age: 54
End: 2024-07-25
Payer: COMMERCIAL

## 2024-07-25 DIAGNOSIS — E11.9 TYPE 2 DIABETES MELLITUS WITHOUT COMPLICATION, WITHOUT LONG-TERM CURRENT USE OF INSULIN (H): ICD-10-CM

## 2024-07-25 RX ORDER — TIRZEPATIDE 5 MG/.5ML
INJECTION, SOLUTION SUBCUTANEOUS
Qty: 2 ML | Refills: 0 | Status: CANCELLED | OUTPATIENT
Start: 2024-07-25

## 2024-08-18 ENCOUNTER — MYC MEDICAL ADVICE (OUTPATIENT)
Dept: FAMILY MEDICINE | Facility: CLINIC | Age: 54
End: 2024-08-18
Payer: COMMERCIAL

## 2024-08-18 DIAGNOSIS — E11.9 TYPE 2 DIABETES MELLITUS WITHOUT COMPLICATION, WITHOUT LONG-TERM CURRENT USE OF INSULIN (H): ICD-10-CM

## 2024-09-14 ENCOUNTER — MYC MEDICAL ADVICE (OUTPATIENT)
Dept: FAMILY MEDICINE | Facility: CLINIC | Age: 54
End: 2024-09-14
Payer: COMMERCIAL

## 2024-09-14 DIAGNOSIS — E11.9 TYPE 2 DIABETES MELLITUS WITHOUT COMPLICATION, WITHOUT LONG-TERM CURRENT USE OF INSULIN (H): ICD-10-CM

## 2024-09-16 NOTE — TELEPHONE ENCOUNTER
Mounjaro 12.5 mg request - this is an increase from previous. [facility] pharmacy in [place].     Routed message to provider to continue with conversation.     Routed to provider for review and approval/denial of Mounjaro.     Trinity Bates RN on 9/16/2024 at 11:21 AM

## 2024-10-13 ENCOUNTER — MYC MEDICAL ADVICE (OUTPATIENT)
Dept: FAMILY MEDICINE | Facility: CLINIC | Age: 54
End: 2024-10-13
Payer: COMMERCIAL

## 2024-10-13 DIAGNOSIS — E11.9 TYPE 2 DIABETES MELLITUS WITHOUT COMPLICATION, WITHOUT LONG-TERM CURRENT USE OF INSULIN (H): ICD-10-CM

## 2024-11-10 ENCOUNTER — MYC MEDICAL ADVICE (OUTPATIENT)
Dept: FAMILY MEDICINE | Facility: CLINIC | Age: 54
End: 2024-11-10
Payer: COMMERCIAL

## 2024-11-10 DIAGNOSIS — E11.9 TYPE 2 DIABETES MELLITUS WITHOUT COMPLICATION, WITHOUT LONG-TERM CURRENT USE OF INSULIN (H): ICD-10-CM

## 2024-11-11 NOTE — TELEPHONE ENCOUNTER
Dr. Lozano signed 6 mL with 4 refills in October. Per Epic should last 420 days.     Rob Fuentes RN on 11/11/2024 at 10:10 AM

## 2024-11-29 PROBLEM — M24.151 DEGENERATIVE TEAR OF ACETABULAR LABRUM OF RIGHT HIP: Status: RESOLVED | Noted: 2018-09-07 | Resolved: 2024-11-29

## 2025-01-31 ENCOUNTER — MYC MEDICAL ADVICE (OUTPATIENT)
Dept: DERMATOLOGY | Facility: CLINIC | Age: 55
End: 2025-01-31

## 2025-05-02 NOTE — PATIENT INSTRUCTIONS
"  Instructions for Patients  After Your COVID-19 (Coronavirus) Test  You have been tested for COVID-19 (coronavirus).   If you'll have surgery in the next few days, we'll let you know ahead of time if you have the virus. Please call your surgeon's office with any questions.  For all other patients: Results are usually available in [employer] within 2 to 3 days.   If you do not have a [employer] account, you'll get a letter in the mail in about 7 to 10 days.   [employer] is often the fastest way to get test results. Please sign up if you do not already have a [employer] [employer] account. See the handout Getting COVID-19 Test Results in [employer] for help.  What if my test result is positive?  If your test is positive and you have not viewed your result in [facility] [employer], you'll get a phone call with your result. (A positive test means that you have the virus.)     Follow the tips under \"How do I self-isolate?\" below for 10 days (20 days if you have a weak immune system).    You don't need to be retested for COVID-19 before going back to school or work. As long as you're fever-free and feeling better, you can go back to school, work and other activities after waiting the 10 or 20 days.  What if I have questions after I get my results?  If you have questions about your results, please visit our testing website at [email or web address].   After 7 to 10 days, if you have not gotten your results:     Call [phone] ([phone]) and ask to speak with our COVID-19 results team.    If you're being treated at an infusion center: Call your infusion center directly.  What are the symptoms of COVID-19?  Cough, fever and trouble breathing are the most common signs of COVID-19.  Other symptoms can include new headaches, new muscle or body aches, new and unexplained fatigue (feeling very tired), chills, sore throat, congestion (stuffy or runny nose), diarrhea (loose poop), loss of taste or smell, belly pain, and nausea or " "vomiting (feeling sick to your stomach or throwing up).  You may already have symptoms of COVID-19, or they may show up later.  What should I do if I have symptoms?  If you're having surgery: Call your surgeon's office.  For all other patients: Stay home and away from others (self-isolate) until ...    You've had no fever--and no medicine that reduces fever--for 1 full day (24 hours), AND    Other symptoms have gotten better. For example, your cough or breathing has improved, AND    At least 10 days have passed since your symptoms first started.  How do I self-isolate?    Stay in your own room, even for meals. Use your own bathroom if you can.    Stay away from others in your home. No hugging, kissing or shaking hands. No visitors.    Don't go to work, school or anywhere else.    Clean \"high touch\" surfaces often (doorknobs, counters, handles). Use household cleaning spray or wipes. You'll find a full list of  on the EPA website: www.epa.gov/pesticide-registration/list-n-disinfectants-use-against-sars-cov-2.    Cover your mouth and nose with a mask or other face covering to avoid spreading germs.    Wash your hands and face often. Use soap and water.    Caregivers in these groups are at risk for severe illness due to COVID-19:  ? People 65 years and older  ? People who live in a nursing home or long-term care facility  ? People with chronic disease (lung, heart, cancer, diabetes, kidney, liver, immunologic)  ? People who have a weakened immune system, including those who:    Are in cancer treatment    Take medicine that weakens the immune system, such as corticosteroids    Had a bone marrow or organ transplant    Have an immune deficiency    Have poorly controlled HIV or AIDS    Are obese (body mass index of 40 or higher)    Smoke regularly    Caregivers should wear gloves while washing dishes, handling laundry and cleaning bedrooms and bathrooms.    Use caution when washing and drying laundry: Don't shake " dirty laundry and use the warmest water setting that you can.    For more tips on managing your health at home, go to www.cdc.gov/coronavirus/2019-ncov/downloads/10Things.pdf.  How can I take care of myself at home?  1. Get lots of rest. Drink extra fluids (unless a doctor has told you not to).  2. Take Tylenol (acetaminophen) for fever or pain. If you have liver or kidney problems, ask your family doctor if it's OK to take Tylenol.   Adults can take either:  ? 650 mg (two 325 mg pills) every 4 to 6 hours, or   ? 1,000 mg (two 500 mg pills) every 8 hours as needed.  ? Note: Don't take more than 3,000 mg in one day. Acetaminophen is found in many medicines (both prescribed and over-the-counter medicines). Read all labels to be sure you don't take too much.   For children, check the Tylenol bottle for the right dose. The dose is based on the child's age or weight.  3. If you have other health problems (like cancer, heart failure, an organ transplant or severe kidney disease): Call your specialty clinic if you don't feel better in the next 2 days.  4. Know when to call 911. Emergency warning signs include:  ? Trouble breathing or shortness of breath  ? Chest pain or pressure that doesn't go away  ? Feeling confused like you haven't felt before, or not being able to wake up  ? Bluish-colored lips or face  5. If your doctor prescribed a blood thinner medicine: Follow their instructions.  Where can I get more information?    [facility] - About COVID-19:   [email or web address]    CDC - If You're Sick: cdc.gov/coronavirus/2019-ncov/about/steps-when-sick.html    CDC - Ending Home Isolation: www.cdc.gov/coronavirus/2019-ncov/hcp/disposition-in-home-patients.html    CDC - Caring for Someone: www.cdc.gov/coronavirus/2019-ncov/if-you-are-sick/care-for-someone.html    [facility] - Interim Guidance for Hospital Discharge to Home: www.[facility] hospital.mn./diseases/coronavirus/hcp/hospdischarge.pdf    [facility]  [place] clinical trials (COVID-19 research studies): clinicalaffairs.[facility]-clinical-trials    Below are the COVID-19 hotlines at the [facility] of Health ([facility]). Interpreters are available.  ? For health questions: Call 215-915-4726 or [phone] (7 a.m. to 7 p.m.)  ? For questions about schools and childcare: Call 478-368-6294 or [phone] (7 a.m. to 7 p.m.)    For informational purposes only. Not to replace the advice of your health care provider. Clinically reviewed by Infection Prevention and the [facility] COVID-19 Clinical Team. Copyright   2020 [facility]. All rights reserved. [employer] 738316 - Rev 11/11/20.      Thank you for limiting contact with others, wearing a simple mask to cover your cough, practice good hand hygiene habits and accessing our virtual services where possible to limit the spread of this virus.    For more information about COVID19 and options for caring for yourself at home, please visit the CDC website at https://www.cdc.gov/coronavirus/2019-ncov/about/steps-when-sick.html  For more options for care at [facility], please visit our website at [email or web address]    (V5) oriented

## 2025-05-18 SDOH — HEALTH STABILITY: PHYSICAL HEALTH: ON AVERAGE, HOW MANY MINUTES DO YOU ENGAGE IN EXERCISE AT THIS LEVEL?: 30 MIN

## 2025-05-18 SDOH — HEALTH STABILITY: PHYSICAL HEALTH: ON AVERAGE, HOW MANY DAYS PER WEEK DO YOU ENGAGE IN MODERATE TO STRENUOUS EXERCISE (LIKE A BRISK WALK)?: 1 DAY

## 2025-05-18 ASSESSMENT — SOCIAL DETERMINANTS OF HEALTH (SDOH): HOW OFTEN DO YOU GET TOGETHER WITH FRIENDS OR RELATIVES?: ONCE A WEEK

## 2025-05-23 ENCOUNTER — OFFICE VISIT (OUTPATIENT)
Dept: FAMILY MEDICINE | Facility: CLINIC | Age: 55
End: 2025-05-23
Attending: FAMILY MEDICINE
Payer: COMMERCIAL

## 2025-05-23 VITALS
OXYGEN SATURATION: 98 % | SYSTOLIC BLOOD PRESSURE: 122 MMHG | WEIGHT: 280.2 LBS | HEIGHT: 71 IN | RESPIRATION RATE: 16 BRPM | BODY MASS INDEX: 39.23 KG/M2 | DIASTOLIC BLOOD PRESSURE: 82 MMHG | HEART RATE: 87 BPM | TEMPERATURE: 98.2 F

## 2025-05-23 DIAGNOSIS — E78.2 MIXED HYPERLIPIDEMIA: ICD-10-CM

## 2025-05-23 DIAGNOSIS — J31.0 CHRONIC RHINITIS: ICD-10-CM

## 2025-05-23 DIAGNOSIS — Z00.00 ROUTINE GENERAL MEDICAL EXAMINATION AT A HEALTH CARE FACILITY: Primary | ICD-10-CM

## 2025-05-23 DIAGNOSIS — E11.9 TYPE 2 DIABETES MELLITUS WITHOUT COMPLICATION, WITHOUT LONG-TERM CURRENT USE OF INSULIN (H): ICD-10-CM

## 2025-05-23 DIAGNOSIS — F41.1 GAD (GENERALIZED ANXIETY DISORDER): ICD-10-CM

## 2025-05-23 DIAGNOSIS — E66.01 SEVERE OBESITY (BMI 35.0-39.9) WITH COMORBIDITY (H): ICD-10-CM

## 2025-05-23 DIAGNOSIS — G47.33 OBSTRUCTIVE SLEEP APNEA SYNDROME: ICD-10-CM

## 2025-05-23 DIAGNOSIS — Z13.6 SCREENING FOR CARDIOVASCULAR CONDITION: ICD-10-CM

## 2025-05-23 DIAGNOSIS — N52.2 DRUG-INDUCED ERECTILE DYSFUNCTION: ICD-10-CM

## 2025-05-23 LAB
ANION GAP SERPL CALCULATED.3IONS-SCNC: 11 MMOL/L (ref 7–15)
BUN SERPL-MCNC: 16 MG/DL (ref 6–20)
CALCIUM SERPL-MCNC: 9.7 MG/DL (ref 8.8–10.4)
CHLORIDE SERPL-SCNC: 104 MMOL/L (ref 98–107)
CHOLEST SERPL-MCNC: 120 MG/DL
CREAT SERPL-MCNC: 0.93 MG/DL (ref 0.67–1.17)
CREAT UR-MCNC: 221.7 MG/DL
EGFRCR SERPLBLD CKD-EPI 2021: >90 ML/MIN/1.73M2
EST. AVERAGE GLUCOSE BLD GHB EST-MCNC: 114 MG/DL
FASTING STATUS PATIENT QL REPORTED: YES
FASTING STATUS PATIENT QL REPORTED: YES
GLUCOSE SERPL-MCNC: 98 MG/DL (ref 70–99)
HBA1C MFR BLD: 5.6 %
HCO3 SERPL-SCNC: 27 MMOL/L (ref 22–29)
HDLC SERPL-MCNC: 40 MG/DL
LDLC SERPL CALC-MCNC: 62 MG/DL
MICROALBUMIN UR-MCNC: 15.9 MG/L
MICROALBUMIN/CREAT UR: 7.17 MG/G CR (ref 0–17)
NONHDLC SERPL-MCNC: 80 MG/DL
POTASSIUM SERPL-SCNC: 4.4 MMOL/L (ref 3.4–5.3)
SODIUM SERPL-SCNC: 142 MMOL/L (ref 135–145)
TRIGL SERPL-MCNC: 88 MG/DL

## 2025-05-23 PROCEDURE — G2211 COMPLEX E/M VISIT ADD ON: HCPCS | Performed by: FAMILY MEDICINE

## 2025-05-23 PROCEDURE — 80048 BASIC METABOLIC PNL TOTAL CA: CPT | Performed by: FAMILY MEDICINE

## 2025-05-23 PROCEDURE — 3044F HG A1C LEVEL LT 7.0%: CPT | Performed by: FAMILY MEDICINE

## 2025-05-23 PROCEDURE — 82043 UR ALBUMIN QUANTITATIVE: CPT | Performed by: FAMILY MEDICINE

## 2025-05-23 PROCEDURE — 1126F AMNT PAIN NOTED NONE PRSNT: CPT | Performed by: FAMILY MEDICINE

## 2025-05-23 PROCEDURE — 82570 ASSAY OF URINE CREATININE: CPT | Performed by: FAMILY MEDICINE

## 2025-05-23 PROCEDURE — 99396 PREV VISIT EST AGE 40-64: CPT | Performed by: FAMILY MEDICINE

## 2025-05-23 PROCEDURE — 3048F LDL-C <100 MG/DL: CPT | Performed by: FAMILY MEDICINE

## 2025-05-23 PROCEDURE — 83036 HEMOGLOBIN GLYCOSYLATED A1C: CPT | Performed by: FAMILY MEDICINE

## 2025-05-23 PROCEDURE — 80061 LIPID PANEL: CPT | Performed by: FAMILY MEDICINE

## 2025-05-23 PROCEDURE — 99214 OFFICE O/P EST MOD 30 MIN: CPT | Mod: 25 | Performed by: FAMILY MEDICINE

## 2025-05-23 PROCEDURE — 3074F SYST BP LT 130 MM HG: CPT | Performed by: FAMILY MEDICINE

## 2025-05-23 PROCEDURE — 3079F DIAST BP 80-89 MM HG: CPT | Performed by: FAMILY MEDICINE

## 2025-05-23 PROCEDURE — 36415 COLL VENOUS BLD VENIPUNCTURE: CPT | Performed by: FAMILY MEDICINE

## 2025-05-23 RX ORDER — VENLAFAXINE HYDROCHLORIDE 150 MG/1
150 CAPSULE, EXTENDED RELEASE ORAL DAILY
Qty: 90 CAPSULE | Refills: 4 | Status: SHIPPED | OUTPATIENT
Start: 2025-05-23

## 2025-05-23 RX ORDER — ATORVASTATIN CALCIUM 40 MG/1
40 TABLET, FILM COATED ORAL DAILY
Qty: 90 TABLET | Refills: 4 | Status: SHIPPED | OUTPATIENT
Start: 2025-05-23

## 2025-05-23 RX ORDER — MONTELUKAST SODIUM 10 MG/1
1 TABLET ORAL AT BEDTIME
Qty: 90 TABLET | Refills: 4 | Status: SHIPPED | OUTPATIENT
Start: 2025-05-23

## 2025-05-23 RX ORDER — SILDENAFIL 100 MG/1
TABLET, FILM COATED ORAL
Qty: 10 TABLET | Refills: 11 | Status: SHIPPED | OUTPATIENT
Start: 2025-05-23

## 2025-05-23 ASSESSMENT — PAIN SCALES - GENERAL: PAINLEVEL_OUTOF10: NO PAIN (0)

## 2025-05-23 NOTE — PROGRESS NOTES
Preventive Care Visit  [facility]  Timothy Lozano MD, Family Medicine  May 23, 2025      Assessment & Plan     ASSESSMENT/ORDERS:    ICD-10-CM    1. Routine general medical examination at a health care facility  Z00.00       2. Type 2 diabetes mellitus without complication, without long-term current use of insulin (H)  E11.9 BASIC METABOLIC PANEL     Lipid panel reflex to direct LDL Fasting     Albumin Random Urine Quantitative with Creat Ratio     HEMOGLOBIN A1C     tirzepatide (MOUNJARO) 15 MG/0.5ML SOAJ auto-injector pen     FOOT EXAM     BASIC METABOLIC PANEL     Lipid panel reflex to direct LDL Fasting     Albumin Random Urine Quantitative with Creat Ratio     HEMOGLOBIN A1C     OFFICE/OUTPT VISIT,EST,LEVL IV      3. Severe obesity (BMI 35.0-39.9) with comorbidity (H)  E66.01 OFFICE/OUTPT VISIT,EST,LEVL IV      4. Mixed hyperlipidemia  E78.2 atorvastatin (LIPITOR) 40 MG tablet     OFFICE/OUTPT VISIT,EST,LEVL IV      5. Screening for cardiovascular condition  Z13.6       6. GUS (generalized anxiety disorder)  F41.1 venlafaxine (EFFEXOR XR) 150 MG 24 hr capsule     OFFICE/OUTPT VISIT,EST,LEVL IV      7. Chronic rhinitis  J31.0 montelukast (SINGULAIR) 10 MG tablet     Adult ENT  Referral     OFFICE/OUTPT VISIT,EST,LEVL IV      8. Drug-induced erectile dysfunction  N52.2 sildenafil (VIAGRA) 100 MG tablet     OFFICE/OUTPT VISIT,EST,LEVL IV      9. Obstructive sleep apnea syndrome  G47.33 Adult ENT  Referral     OFFICE/OUTPT VISIT,EST,LEVL IV        PLAN:  John Price is a 54 year old male with a history of T2DM, HPL, and GUS who presents today for annual preventative visit. Risks and recommendations reviewed for the patient's health maintenance.  Not due for any vaccines today.  Not due for colon cancer screening.  Refilled medications for chronic conditions as below.  Will plan for follow-up visit in 1 year with 3-6 month lab monitoring of A1c in the  "interim given his stability.  A1c was obtained today, which resulted after the patient left and was 5.6, similar to previous.  The patient continues to have good blood sugar control on Mounjaro only.  Labs were obtained today for his diabetes and resulted after the patient left without concern for diabetic nephropathy.  Lipids stable on atorvastatin.  Foot exam was normal without evidence of neuropathic changes.  He has an upcoming eye exam and will send documentation from that to the clinic.  Refills sent for Mounjaro.  The patient has lost 10 pounds since last visit on Mounjaro.  We discussed the importance of continued exercise to maintain muscle tone.  Discussed healthy diet and exercise guidelines.  4&5. As above, lipids stable on atorvastatin.  Refill ordered.  6. Stable on venlafaxine.  Refill ordered.  7.  Patient's chronic rhinitis is stable yet still bothersome on his current regimen of montelukast and Zyrtec.  Recommended ENT referral, especially given untreated JOSE F with CPAP intolerance and interested in discussing possible interventions such as Inspire device.  Referral placed.  8.  Stable.  Refill ordered.  9.  As above, patient unable to tolerate CPAP.  Works night shift and has significant difficulty obtaining both amount and quality of sleep.  Discussed possible Inspire versus dental devices briefly and recommended ENT referral with Dr. Newman given his experience with both ENT and sleep. The patient's BMI may be a limiting factor for Inspire, however he is demonstrating weight loss on current regimen.    The longitudinal plan of care for the diagnosis(es)/condition(s) as documented were addressed during this visit. Due to the added complexity in care, I will continue to support John in the subsequent management and with ongoing continuity of care.     BMI  Estimated body mass index is 39.08 kg/m  as calculated from the following:    Height as of this encounter: 1.803 m (5' 11\").    Weight as " "of this encounter: 127.1 kg (280 lb 3.2 oz).   Weight management plan: Discussed healthy diet and exercise guidelines and also taking Mounjaro for T2DM with weight loss side effect.    Counseling  Appropriate preventive services were addressed with this patient via screening, questionnaire, or discussion as appropriate for fall prevention, nutrition, physical activity, Tobacco-use cessation, social engagement, weight loss and cognition.  Checklist reviewing preventive services available has been given to the patient.  Reviewed patient's diet, addressing concerns and/or questions.   He is at risk for lack of exercise and has been provided with information to increase physical activity for the benefit of his well-being.     Osito Lopez is a 54 year old, presenting for the following:  Physical        5/23/2025     9:59 AM   Additional Questions   Roomed by Rashmi WEISS     John Price is a 54 year old male with a history of type 2 diabetes, hyperlipidemia, GUS, chronic rhinitis, and JOSE F who presents today for annual preventative visit.    He continues to take tirzepatide with loss of 10 lbs. over the last 6 months. He does not check his blood sugar often. He has occasional low blood sugar when he feels a bit \"off\" and eats some food with resolution of symptoms. This happens no more than 1-2x/month. He has an upcoming eye exam. No foot concerns--no numbness, tingling, or ulcers.    He reports he works night shift and sleeps during the day in a recliner due to chronic rhinitis. He has seen ENT in [place] in the past for this. He feels the montelukast and Zyrtec is decent at helping this, but does not fully resolve his symptoms. He has not tolerated his CPAP in the past and so does not use it. He notes he is a light sleeper and wakes often with light noises.     He reports his anxiety has been well controlled on venlafaxine. No side effects noted.    He states his sildenafil works well for him and " would like a refill.    Advance Care Planning    Discussed advance care planning with patient; however, patient declined at this time.        5/18/2025   General Health   How would you rate your overall physical health? Good   Feel stress (tense, anxious, or unable to sleep) Only a little   (!) STRESS CONCERN      5/18/2025   Nutrition   Three or more servings of calcium each day? Yes   Diet: Regular (no restrictions)   How many servings of fruit and vegetables per day? (!) 0-1   How many sweetened beverages each day? 0-1         5/18/2025   Exercise   Days per week of moderate/strenous exercise 1 day   Average minutes spent exercising at this level 30 min   (!) EXERCISE CONCERN      5/18/2025   Social Factors   Frequency of gathering with friends or relatives Once a week   Worry food won't last until get money to buy more No   Food not last or not have enough money for food? No   Do you have housing? (Housing is defined as stable permanent housing and does not include staying outside in a car, in a tent, in an abandoned building, in an overnight shelter, or couch-surfing.) Yes   Are you worried about losing your housing? No   Lack of transportation? No   Unable to get utilities (heat,electricity)? No         5/18/2025   Fall Risk   Fallen 2 or more times in the past year? No   Trouble with walking or balance? No          5/18/2025   Dental   Dentist two times every year? Yes         Today's PHQ-2 Score:       5/23/2025     9:54 AM   PHQ-2 ( 1999 Pfizer)   Q1: Little interest or pleasure in doing things 0   Q2: Feeling down, depressed or hopeless 0   PHQ-2 Score 0    Q1: Little interest or pleasure in doing things Not at all   Q2: Feeling down, depressed or hopeless Not at all   PHQ-2 Score 0       Patient-reported           5/18/2025   Substance Use   Alcohol more than 3/day or more than 7/wk No   Do you use any other substances recreationally? No     Social History     Tobacco Use    Smoking status: Never     "Smokeless tobacco: Former     Types: Snuff     Quit date: 8/15/2020   Vaping Use    Vaping status: Never Used   Substance Use Topics    Alcohol use: Yes    Drug use: Never           5/18/2025   STI Screening   New sexual partner(s) since last STI/HIV test? No   Last PSA: No results found for: \"PSA\"  ASCVD Risk   The ASCVD Risk score (Juan M BARAJAS, et al., 2019) failed to calculate for the following reasons:    The valid total cholesterol range is 130 to 320 mg/dL       Reviewed and updated as needed this visit by Provider   Tobacco  Allergies  Meds  Problems  Med Hx  Surg Hx  Fam Hx                 Objective    Exam  /82   Pulse 87   Temp 98.2  F (36.8  C) (Temporal)   Resp 16   Ht 1.803 m (5' 11\")   Wt 127.1 kg (280 lb 3.2 oz)   SpO2 98%   BMI 39.08 kg/m     Estimated body mass index is 39.08 kg/m  as calculated from the following:    Height as of this encounter: 1.803 m (5' 11\").    Weight as of this encounter: 127.1 kg (280 lb 3.2 oz).    Physical Exam  Vitals and nursing note reviewed.   Constitutional:       General: He is not in acute distress.     Appearance: He is not toxic-appearing or diaphoretic.   HENT:      Head: Normocephalic and atraumatic.      Right Ear: External ear normal.      Left Ear: External ear normal.      Nose: Nose normal.      Mouth/Throat:      Mouth: Mucous membranes are moist.   Eyes:      General: No scleral icterus.     Conjunctiva/sclera: Conjunctivae normal.   Cardiovascular:      Rate and Rhythm: Normal rate and regular rhythm.      Pulses:           Dorsalis pedis pulses are 2+ on the right side and 2+ on the left side.        Posterior tibial pulses are 2+ on the right side and 2+ on the left side.      Heart sounds: Normal heart sounds. No murmur heard.     Comments: Carotids normal on auscultation.  Pulmonary:      Effort: Pulmonary effort is normal. No respiratory distress.      Breath sounds: Normal breath sounds.   Abdominal:      General: Bowel " sounds are normal.      Palpations: Abdomen is soft.   Musculoskeletal:         General: Normal range of motion.      Cervical back: Normal range of motion.      Right lower leg: No edema.      Left lower leg: No edema.   Feet:      Right foot:      Protective Sensation: 5 sites tested.  5 sites sensed.      Skin integrity: Skin integrity normal. No ulcer, blister or skin breakdown.      Toenail Condition: Right toenails are normal.      Left foot:      Protective Sensation: 5 sites tested.  5 sites sensed.      Skin integrity: Skin integrity normal. No ulcer, blister or skin breakdown.      Toenail Condition: Left toenails are normal.   Lymphadenopathy:      Cervical: No cervical adenopathy.   Skin:     Capillary Refill: Capillary refill takes less than 2 seconds.   Neurological:      General: No focal deficit present.      Mental Status: He is alert and oriented to person, place, and time.      Deep Tendon Reflexes: Reflexes normal.   Psychiatric:         Mood and Affect: Mood normal.         Behavior: Behavior normal.         Thought Content: Thought content normal.         Judgment: Judgment normal.       No results found for this or any previous visit (from the past 24 hours).        Patient was seen and examined by myself and Dr. Timothy Lozano MD. The note was then documented by me.  Jasen Dotson, MS4  [employer]  05/23/25    I was present with the medical student who participated in the service and in the documentation of the note. I have verified the history and personally performed the physical exam and medical decision making. I agree with the assessment and plan of care as documented in the note.     Signed Electronically by: Timothy Lozano MD

## 2025-05-23 NOTE — PATIENT INSTRUCTIONS
Patient Education   Preventive Care Advice   This is general advice given by our system to help you stay healthy. However, your care team may have specific advice just for you. Please talk to your care team about your preventive care needs.  Nutrition  Eat 5 or more servings of fruits and vegetables each day.  Try wheat bread, brown rice and whole grain pasta (instead of white bread, rice, and pasta).  Get enough calcium and vitamin D. Check the label on foods and aim for 100% of the RDA (recommended daily allowance).  Lifestyle  Exercise at least 150 minutes each week  (30 minutes a day, 5 days a week).  Do muscle strengthening activities 2 days a week. These help control your weight and prevent disease.  No smoking.  Wear sunscreen to prevent skin cancer.  Have a dental exam and cleaning every 6 months.  Yearly exams  See your health care team every year to talk about:  Any changes in your health.  Any medicines your care team has prescribed.  Preventive care, family planning, and ways to prevent chronic diseases.  Shots (vaccines)   HPV shots (up to age 26), if you've never had them before.  Hepatitis B shots (up to age 59), if you've never had them before.  COVID-19 shot: Get this shot when it's due.  Flu shot: Get a flu shot every year.  Tetanus shot: Get a tetanus shot every 10 years.  Pneumococcal, hepatitis A, and RSV shots: Ask your care team if you need these based on your risk.  Shingles shot (for age 50 and up)  General health tests  Diabetes screening:  Starting at age 35, Get screened for diabetes at least every 3 years.  If you are younger than age 35, ask your care team if you should be screened for diabetes.  Cholesterol test: At age 39, start having a cholesterol test every 5 years, or more often if advised.  Bone density scan (DEXA): At age 50, ask your care team if you should have this scan for osteoporosis (brittle bones).  Hepatitis C: Get tested at least once in your life.  STIs (sexually  transmitted infections)  Before age 24: Ask your care team if you should be screened for STIs.  After age 24: Get screened for STIs if you're at risk. You are at risk for STIs (including HIV) if:  You are sexually active with more than one person.  You don't use condoms every time.  You or a partner was diagnosed with a sexually transmitted infection.  If you are at risk for HIV, ask about PrEP medicine to prevent HIV.  Get tested for HIV at least once in your life, whether you are at risk for HIV or not.  Cancer screening tests  Cervical cancer screening: If you have a cervix, begin getting regular cervical cancer screening tests starting at age 21.  Breast cancer scan (mammogram): If you've ever had breasts, begin having regular mammograms starting at age 40. This is a scan to check for breast cancer.  Colon cancer screening: It is important to start screening for colon cancer at age 45.  Have a colonoscopy test every 10 years (or more often if you're at risk) Or, ask your provider about stool tests like a FIT test every year or Cologuard test every 3 years.  To learn more about your testing options, visit:   .  For help making a decision, visit:   https://bit.ly/zi35431.  Prostate cancer screening test: If you have a prostate, ask your care team if a prostate cancer screening test (PSA) at age 55 is right for you.  Lung cancer screening: If you are a current or former smoker ages 50 to 80, ask your care team if ongoing lung cancer screenings are right for you.  For informational purposes only. Not to replace the advice of your health care provider. Copyright   2023 [employer]. All rights reserved. Clinically reviewed by the [facility] Transitions Program. [employer] 935821 - REV 01/24.

## 2025-05-26 ENCOUNTER — PATIENT OUTREACH (OUTPATIENT)
Dept: CARE COORDINATION | Facility: CLINIC | Age: 55
End: 2025-05-26
Payer: COMMERCIAL

## 2025-05-28 ENCOUNTER — PATIENT OUTREACH (OUTPATIENT)
Dept: CARE COORDINATION | Facility: CLINIC | Age: 55
End: 2025-05-28
Payer: COMMERCIAL

## 2025-05-30 ENCOUNTER — TRANSFERRED RECORDS (OUTPATIENT)
Dept: HEALTH INFORMATION MANAGEMENT | Facility: CLINIC | Age: 55
End: 2025-05-30
Payer: COMMERCIAL

## 2025-05-30 LAB — RETINOPATHY: NEGATIVE

## 2025-06-23 DIAGNOSIS — E78.2 MIXED HYPERLIPIDEMIA: ICD-10-CM

## 2025-06-24 RX ORDER — ATORVASTATIN CALCIUM 40 MG/1
40 TABLET, FILM COATED ORAL DAILY
Qty: 90 TABLET | Refills: 2 | Status: SHIPPED | OUTPATIENT
Start: 2025-06-24

## 2025-07-26 DIAGNOSIS — F41.1 GAD (GENERALIZED ANXIETY DISORDER): ICD-10-CM

## 2025-07-28 RX ORDER — VENLAFAXINE HYDROCHLORIDE 150 MG/1
150 CAPSULE, EXTENDED RELEASE ORAL DAILY
Qty: 90 CAPSULE | Refills: 3 | Status: SHIPPED | OUTPATIENT
Start: 2025-07-28

## 2025-08-07 ENCOUNTER — ANCILLARY PROCEDURE (OUTPATIENT)
Dept: GENERAL RADIOLOGY | Facility: CLINIC | Age: 55
End: 2025-08-07
Attending: PODIATRIST
Payer: COMMERCIAL

## 2025-08-07 ENCOUNTER — OFFICE VISIT (OUTPATIENT)
Dept: PODIATRY | Facility: CLINIC | Age: 55
End: 2025-08-07
Payer: COMMERCIAL

## 2025-08-07 VITALS — WEIGHT: 276 LBS | BODY MASS INDEX: 38.64 KG/M2 | HEIGHT: 71 IN

## 2025-08-07 DIAGNOSIS — E11.9 TYPE 2 DIABETES MELLITUS WITHOUT COMPLICATION, WITHOUT LONG-TERM CURRENT USE OF INSULIN (H): Primary | ICD-10-CM

## 2025-08-07 DIAGNOSIS — M25.579 ANKLE PAIN: ICD-10-CM

## 2025-08-07 DIAGNOSIS — E66.01 SEVERE OBESITY (BMI 35.0-39.9) WITH COMORBIDITY (H): ICD-10-CM

## 2025-08-07 DIAGNOSIS — M19.071 DJD (DEGENERATIVE JOINT DISEASE), ANKLE AND FOOT, RIGHT: ICD-10-CM

## 2025-08-07 DIAGNOSIS — M65.979 SYNOVITIS OF ANKLE: ICD-10-CM

## 2025-08-07 PROCEDURE — 73610 X-RAY EXAM OF ANKLE: CPT | Mod: TC | Performed by: RADIOLOGY

## 2025-08-07 ASSESSMENT — PAIN SCALES - GENERAL: PAINLEVEL_OUTOF10: MILD PAIN (2)

## 2025-08-18 ENCOUNTER — DOCUMENTATION ONLY (OUTPATIENT)
Dept: OTHER | Facility: CLINIC | Age: 55
End: 2025-08-18
Payer: COMMERCIAL

## 2025-08-23 ENCOUNTER — APPOINTMENT (OUTPATIENT)
Dept: GENERAL RADIOLOGY | Facility: CLINIC | Age: 55
End: 2025-08-23
Attending: STUDENT IN AN ORGANIZED HEALTH CARE EDUCATION/TRAINING PROGRAM
Payer: COMMERCIAL

## 2025-08-23 ENCOUNTER — HOSPITAL ENCOUNTER (EMERGENCY)
Facility: CLINIC | Age: 55
Discharge: HOME | End: 2025-08-23
Attending: STUDENT IN AN ORGANIZED HEALTH CARE EDUCATION/TRAINING PROGRAM | Admitting: STUDENT IN AN ORGANIZED HEALTH CARE EDUCATION/TRAINING PROGRAM
Payer: COMMERCIAL

## 2025-08-23 ENCOUNTER — APPOINTMENT (OUTPATIENT)
Dept: CT IMAGING | Facility: CLINIC | Age: 55
End: 2025-08-23
Attending: STUDENT IN AN ORGANIZED HEALTH CARE EDUCATION/TRAINING PROGRAM
Payer: COMMERCIAL

## 2025-08-23 PROCEDURE — 74177 CT ABD & PELVIS W/CONTRAST: CPT

## 2025-08-23 PROCEDURE — 74018 RADEX ABDOMEN 1 VIEW: CPT

## 2025-08-23 ASSESSMENT — COLUMBIA-SUICIDE SEVERITY RATING SCALE - C-SSRS
2. HAVE YOU ACTUALLY HAD ANY THOUGHTS OF KILLING YOURSELF IN THE PAST MONTH?: NO
6. HAVE YOU EVER DONE ANYTHING, STARTED TO DO ANYTHING, OR PREPARED TO DO ANYTHING TO END YOUR LIFE?: NO
1. IN THE PAST MONTH, HAVE YOU WISHED YOU WERE DEAD OR WISHED YOU COULD GO TO SLEEP AND NOT WAKE UP?: NO

## 2025-08-23 ASSESSMENT — ENCOUNTER SYMPTOMS
SHORTNESS OF BREATH: 0
MYALGIAS: 0
COUGH: 0
ABDOMINAL PAIN: 0
VOMITING: 1
FATIGUE: 0
DYSURIA: 0
DIZZINESS: 0
NAUSEA: 1
CHILLS: 0
ARTHRALGIAS: 0
HEADACHES: 0
NECK STIFFNESS: 0
RHINORRHEA: 0
SORE THROAT: 0
EYE REDNESS: 0
ACTIVITY CHANGE: 0
APPETITE CHANGE: 0
DIARRHEA: 1
FEVER: 0
HEMATURIA: 0

## 2025-08-23 ASSESSMENT — ACTIVITIES OF DAILY LIVING (ADL)
ADLS_ACUITY_SCORE: 41

## 2025-08-30 ENCOUNTER — MYC MEDICAL ADVICE (OUTPATIENT)
Dept: FAMILY MEDICINE | Facility: CLINIC | Age: 55
End: 2025-08-30
Payer: COMMERCIAL

## 2025-08-30 ENCOUNTER — HEALTH MAINTENANCE LETTER (OUTPATIENT)
Age: 55
End: 2025-08-30

## 2025-08-30 DIAGNOSIS — J31.0 CHRONIC RHINITIS: ICD-10-CM

## 2025-08-30 DIAGNOSIS — R79.89 ELEVATED SERUM CREATININE: Primary | ICD-10-CM

## 2025-09-02 RX ORDER — MONTELUKAST SODIUM 10 MG/1
1 TABLET ORAL AT BEDTIME
Qty: 90 TABLET | Refills: 1 | Status: SHIPPED | OUTPATIENT
Start: 2025-09-02